# Patient Record
Sex: MALE | Race: BLACK OR AFRICAN AMERICAN | NOT HISPANIC OR LATINO | Employment: FULL TIME | URBAN - METROPOLITAN AREA
[De-identification: names, ages, dates, MRNs, and addresses within clinical notes are randomized per-mention and may not be internally consistent; named-entity substitution may affect disease eponyms.]

---

## 2017-03-20 ENCOUNTER — CLINICAL SUPPORT (OUTPATIENT)
Dept: INTERNAL MEDICINE | Facility: CLINIC | Age: 62
End: 2017-03-20

## 2017-03-20 DIAGNOSIS — Z02.83 ENCOUNTER FOR DRUG SCREENING: Primary | ICD-10-CM

## 2017-03-20 LAB — BREATH ALCOHOL: 0

## 2017-03-20 PROCEDURE — 82075 ASSAY OF BREATH ETHANOL: CPT | Mod: ,,, | Performed by: INTERNAL MEDICINE

## 2017-03-20 PROCEDURE — 99000 SPECIMEN HANDLING OFFICE-LAB: CPT | Mod: ,,, | Performed by: INTERNAL MEDICINE

## 2017-05-31 ENCOUNTER — DOCUMENTATION ONLY (OUTPATIENT)
Dept: FAMILY MEDICINE | Facility: CLINIC | Age: 62
End: 2017-05-31

## 2017-05-31 NOTE — PROGRESS NOTES
Pre-Visit Chart Review  For Appointment Scheduled on 05/31/2017    Health Maintenance Due   Topic Date Due    Hepatitis C Screening  1955    TETANUS VACCINE  05/03/1973    Colonoscopy  05/03/2005    Hemoglobin A1c  05/09/2012    Zoster Vaccine  05/03/2015    Lipid Panel  12/30/2016

## 2017-06-01 ENCOUNTER — OFFICE VISIT (OUTPATIENT)
Dept: FAMILY MEDICINE | Facility: CLINIC | Age: 62
End: 2017-06-01
Payer: COMMERCIAL

## 2017-06-01 ENCOUNTER — LAB VISIT (OUTPATIENT)
Dept: LAB | Facility: HOSPITAL | Age: 62
End: 2017-06-01
Attending: FAMILY MEDICINE
Payer: COMMERCIAL

## 2017-06-01 VITALS
BODY MASS INDEX: 33.01 KG/M2 | DIASTOLIC BLOOD PRESSURE: 72 MMHG | TEMPERATURE: 99 F | SYSTOLIC BLOOD PRESSURE: 113 MMHG | HEART RATE: 112 BPM | WEIGHT: 217.81 LBS | HEIGHT: 68 IN

## 2017-06-01 DIAGNOSIS — Z91.199 NON COMPLIANCE WITH MEDICAL TREATMENT: Chronic | ICD-10-CM

## 2017-06-01 DIAGNOSIS — Z12.11 COLON CANCER SCREENING: ICD-10-CM

## 2017-06-01 LAB
ALBUMIN SERPL BCP-MCNC: 3.9 G/DL
ALP SERPL-CCNC: 636 U/L
ALT SERPL W/O P-5'-P-CCNC: 40 U/L
ANION GAP SERPL CALC-SCNC: 9 MMOL/L
AST SERPL-CCNC: 20 U/L
BASOPHILS # BLD AUTO: 0.05 K/UL
BASOPHILS NFR BLD: 0.5 %
BILIRUB SERPL-MCNC: 0.5 MG/DL
BUN SERPL-MCNC: 21 MG/DL
CALCIUM SERPL-MCNC: 10.2 MG/DL
CHLORIDE SERPL-SCNC: 97 MMOL/L
CHOLEST/HDLC SERPL: 7.8 {RATIO}
CO2 SERPL-SCNC: 29 MMOL/L
CREAT SERPL-MCNC: 1.6 MG/DL
DIFFERENTIAL METHOD: ABNORMAL
EOSINOPHIL # BLD AUTO: 0 K/UL
EOSINOPHIL NFR BLD: 0.3 %
ERYTHROCYTE [DISTWIDTH] IN BLOOD BY AUTOMATED COUNT: 13.7 %
EST. GFR  (AFRICAN AMERICAN): 52.6 ML/MIN/1.73 M^2
EST. GFR  (NON AFRICAN AMERICAN): 45.5 ML/MIN/1.73 M^2
GLUCOSE SERPL-MCNC: 426 MG/DL
HCT VFR BLD AUTO: 46.1 %
HDL/CHOLESTEROL RATIO: 12.9 %
HDLC SERPL-MCNC: 303 MG/DL
HDLC SERPL-MCNC: 39 MG/DL
HGB BLD-MCNC: 16.9 G/DL
LDLC SERPL CALC-MCNC: 224.6 MG/DL
LYMPHOCYTES # BLD AUTO: 3.1 K/UL
LYMPHOCYTES NFR BLD: 29 %
MCH RBC QN AUTO: 27.7 PG
MCHC RBC AUTO-ENTMCNC: 36.7 %
MCV RBC AUTO: 76 FL
MONOCYTES # BLD AUTO: 0.8 K/UL
MONOCYTES NFR BLD: 7.3 %
NEUTROPHILS # BLD AUTO: 6.7 K/UL
NEUTROPHILS NFR BLD: 62.4 %
NONHDLC SERPL-MCNC: 264 MG/DL
PLATELET # BLD AUTO: 289 K/UL
PMV BLD AUTO: 11 FL
POTASSIUM SERPL-SCNC: 4.8 MMOL/L
PROT SERPL-MCNC: 7.8 G/DL
RBC # BLD AUTO: 6.1 M/UL
SODIUM SERPL-SCNC: 135 MMOL/L
T3FREE SERPL-MCNC: 3.2 PG/ML
TRIGL SERPL-MCNC: 197 MG/DL
TSH SERPL DL<=0.005 MIU/L-ACNC: 1.04 UIU/ML
WBC # BLD AUTO: 10.71 K/UL

## 2017-06-01 PROCEDURE — 84443 ASSAY THYROID STIM HORMONE: CPT

## 2017-06-01 PROCEDURE — 4010F ACE/ARB THERAPY RXD/TAKEN: CPT | Mod: S$GLB,,, | Performed by: FAMILY MEDICINE

## 2017-06-01 PROCEDURE — 90471 IMMUNIZATION ADMIN: CPT | Mod: S$GLB,,, | Performed by: FAMILY MEDICINE

## 2017-06-01 PROCEDURE — 90714 TD VACC NO PRESV 7 YRS+ IM: CPT | Mod: S$GLB,,, | Performed by: FAMILY MEDICINE

## 2017-06-01 PROCEDURE — 36415 COLL VENOUS BLD VENIPUNCTURE: CPT | Mod: PO

## 2017-06-01 PROCEDURE — 99999 PR PBB SHADOW E&M-EST. PATIENT-LVL IV: CPT | Mod: PBBFAC,,, | Performed by: FAMILY MEDICINE

## 2017-06-01 PROCEDURE — 99203 OFFICE O/P NEW LOW 30 MIN: CPT | Mod: 25,S$GLB,, | Performed by: FAMILY MEDICINE

## 2017-06-01 PROCEDURE — 85025 COMPLETE CBC W/AUTO DIFF WBC: CPT

## 2017-06-01 PROCEDURE — 90472 IMMUNIZATION ADMIN EACH ADD: CPT | Mod: S$GLB,,, | Performed by: FAMILY MEDICINE

## 2017-06-01 PROCEDURE — 83036 HEMOGLOBIN GLYCOSYLATED A1C: CPT

## 2017-06-01 PROCEDURE — 80053 COMPREHEN METABOLIC PANEL: CPT

## 2017-06-01 PROCEDURE — 84481 FREE ASSAY (FT-3): CPT

## 2017-06-01 PROCEDURE — 90732 PPSV23 VACC 2 YRS+ SUBQ/IM: CPT | Mod: S$GLB,,, | Performed by: FAMILY MEDICINE

## 2017-06-01 PROCEDURE — 80061 LIPID PANEL: CPT

## 2017-06-01 RX ORDER — PRAVASTATIN SODIUM 40 MG/1
40 TABLET ORAL DAILY
Qty: 90 TABLET | Refills: 3 | Status: SHIPPED | OUTPATIENT
Start: 2017-06-01 | End: 2021-02-10

## 2017-06-01 RX ORDER — METFORMIN HYDROCHLORIDE 500 MG/1
500 TABLET ORAL 2 TIMES DAILY WITH MEALS
COMMUNITY
End: 2017-06-01 | Stop reason: SDUPTHER

## 2017-06-01 RX ORDER — NAPROXEN SODIUM 220 MG/1
81 TABLET, FILM COATED ORAL DAILY
Refills: 0 | COMMUNITY
Start: 2017-06-01 | End: 2023-02-02

## 2017-06-01 RX ORDER — PEN NEEDLE, DIABETIC 30 GX3/16"
1 NEEDLE, DISPOSABLE MISCELLANEOUS DAILY
Qty: 300 EACH | Refills: 11 | Status: SHIPPED | OUTPATIENT
Start: 2017-06-01 | End: 2021-02-08 | Stop reason: SDUPTHER

## 2017-06-01 RX ORDER — LISINOPRIL 2.5 MG/1
2.5 TABLET ORAL DAILY
Qty: 90 TABLET | Refills: 3 | Status: SHIPPED | OUTPATIENT
Start: 2017-06-01 | End: 2021-02-10

## 2017-06-01 RX ORDER — ERGOCALCIFEROL 1.25 MG/1
50000 CAPSULE ORAL
Qty: 90 CAPSULE | Refills: 1 | Status: SHIPPED | OUTPATIENT
Start: 2017-06-01 | End: 2017-06-26 | Stop reason: DRUGHIGH

## 2017-06-01 RX ORDER — METFORMIN HYDROCHLORIDE 500 MG/1
1000 TABLET ORAL 2 TIMES DAILY WITH MEALS
Qty: 120 TABLET | Refills: 4 | Status: SHIPPED | OUTPATIENT
Start: 2017-06-01 | End: 2017-06-22 | Stop reason: ALTCHOICE

## 2017-06-01 RX ORDER — INSULIN GLARGINE 300 [IU]/ML
25 INJECTION, SOLUTION SUBCUTANEOUS NIGHTLY
Qty: 3 SYRINGE | Refills: 11 | Status: SHIPPED | OUTPATIENT
Start: 2017-06-01 | End: 2017-06-22 | Stop reason: SDUPTHER

## 2017-06-01 NOTE — PATIENT INSTRUCTIONS
Preventing Cancer  Many cancer cases are linked to lifestyle. Healthy lifestyle choices can help lower your risk for cancer and many other diseases. They can also improve your overall health.    Stop smoking  · Talk with your healthcare provider about aids for quitting, such as nicotine patches and some prescription medicines.  · Get help from ex-smokers.  · Create a plan for quitting.  · Pick a quit date and stick to it.  Stay at a healthy weight  · Get to and stay at a healthy weight all your life.  · If you're overweight, losing even a little weight is good for you.  Keep active  · Get regular physical activity.  · Take walks, garden, or do other activities you enjoy each day.  · Do errands on foot or bike, not by car.  · Join a walking or biking club.  · Limit the time you spend sitting to do things like watch TV, play video games, or use a computer.  Eat a healthy diet  · Eat fewer red meats and processed meats.  · Eat at least 2.5 cups of fruits and vegetables daily, especially leafy greens.  · Eat more whole grains instead of refined grain products.  · Limit alcohol to 2 drinks a day for men and 1 drink a day for women.  · Limit high-calorie foods and drinks.  · Read food labels to be more aware of calories and portion sizes.  Protect yourself from hazards  · When outside during the day, use sunscreen that is broad-spectrum and SPF 30 or greater.  · When out in sunlight, wear a hat and sunglasses.  · Seek shade in the middle of the day when the sun is hottest.  · Be aware of all hazardous products at work or in your home.  · When working with hazardous products, wear protective clothing  Talk with your provider about cancer screenings  Regular screening can help prevent some types of cancer, such as cervical and colorectal cancer. Regular screening for these cancers can find and remove abnormal areas before they become cancer. For some other types of cancer, screening may help find cancer early, when it's  small. This is when treatment is most likely to be effective. Here are some ways you can screen for certain cancers:  · Breast cancer. Breast self-awareness and mammogram.  · Skin cancer. Self-exam, professional exam, biopsy of any changes that might be cancer.  · Cervical cancer. Pap test, HPV test.  · Colorectal cancer. Screening for blood or DNA in stool, colonoscopy or other tests to look inside the colon.  · Prostate cancer. PSA blood test with or without a digital rectal exam.  · Testicular cancer. Self-exam, professional exam.  Talk with your healthcare provider about your family history and your cancer risk. Together you can decide on the cancer screening plan that's best for you.  Date Last Reviewed: 11/18/2015 © 2000-2016 [employer]. [address] [place] [address], [place] [address]. All rights reserved. This information is not intended as a substitute for professional medical care. Always follow your healthcare professional's instructions.        Using a Blood Sugar Log    You have diabetes. This means your body has trouble regulating a sugar called glucose. To help manage your diabetes, youll need to check your blood sugar level as directed by your healthcare provider. Keeping a log of your blood sugar levels will help you track your blood sugar readings. Its a simple and easy way to see how well you are controlling your diabetes.  Checking your blood sugar level  You can check your blood sugar level with a blood glucose meter. Youll first prick the side of your finger with a tiny lancet to draw a tiny drop of blood onto the test strip. Some glucose meters let you use another place on your body to test. But these other places should not be used in some cases as they may be inaccurate. Follow the instructions for your glucose meter. And talk with your healthcare provider before doing the test on other places.  The strip goes into the meter first, then a drop of blood is placed on the tip of the  strip. The meter then shows a reading that tells you the level of your blood sugar. Your readings should be in your target range as often as possible. This means not too high or too low. Staying in this range helps lower your risk for complications. Your healthcare provider will help you figure out the target range that is best for you.  Tracking your readings  Every time you check your blood sugar, use your log to keep track of your readings. Your meter will also probably have a memory feature that your healthcare provider can check at your next visit. You may be advised by your healthcare provider to check your blood sugar in the morning, at bedtime, and before and after meals. Be sure to write down all of your numbers. Also use your log to record things that might have affected your blood sugar. Some examples include being sick, certain medicines, being physically active, feeling stressed, or skipping meals.   Lessons learned from your readings  Tracking your blood sugar readings helps you see patterns. These patterns tell you how your actions affect your blood sugar. For instance, you may have higher numbers after eating certain foods or lower numbers after exercise. They just help you understand how to stay in your target range more often, so that your diabetes remains in good control.  Sharing your log with your healthcare team  Bring your blood sugar log and glucose meter with you to all of your healthcare appointments. This can help your healthcare team make changes to your treatment plan, if needed. This may involve making changes in what you eat, what medicines you take, or how much you exercise.  To learn more  The resources below can help you learn more:  · American Diabetes Association 970-943-6511 www.diabetes.org  · [employer] 444-625-6988 www.lighthouse.org  · [employer] 186-715-6577 www.nei.nih.gov  · Hormone Health Network 510-561-8436 www.hormone.org  Date Last Reviewed:  "5/1/2016  © 3454-9095 [employer]. [address] [place] [address], [place] [address]. All rights reserved. This information is not intended as a substitute for professional medical care. Always follow your healthcare professional's instructions.        Exercise to Manage Your Blood Sugar    Being physically active every day can help you manage your blood sugar. Thats because an active lifestyle can improve your bodys ability to use insulin. Daily activity can also help delay or prevent complications of diabetes. And its a great way to relieve stress. If you arent normally active, be sure to consult your healthcare provider before getting started.  How much activity do you need?  If daily activity is new to you, start slow and steady. Begin with 10 minutes of activity each day. Then work up to at least 40 minutes of moderate to high intensity physical activity on at least 3 to 4 days each week. Do this by adding a few minutes each week. It doesnt have to be done all at once. Each active period throughout the day adds up.  Just move!  You dont have to join a gym or own pricey sports equipment. Just get out and walk. Walking is an aerobic exercise that makes your heart and lungs work hard. It helps your heart and blood vessels. Walking needs only a sturdy pair of sneakers and your own two feet. The more you walk, the easier it gets:  · Schedule time every day to move your feet.  · Make it part of your daily routine.  · Walk with a friend or a group to keep it interesting and fun.  · Try taking several short walks during the day to meet your daily activity goal.  A pedometer makes every step count  A pedometer is a small device that keeps track of how many steps you take. You can clip it to your belt (or a strap on your arm or leg) and go about your daily routine. "Smartphones" now also have apps to record your walking. At the end of the day, the pedometer shows the total number of steps you took. Use a " pedometer to set daily goals for yourself. For instance, if you walk 4,000 steps a day, try adding 200 more steps each day. Aim for a goal of 7,500. With every step, youre doing a little more to help your body use insulin.   Adding resistance exercise  Resistance exercise (also called strength training), makes muscles stronger. It also helps muscles use insulin better. Ask your healthcare provider whether this type of exercise is right for you. If it is, your healthcare provider can help you work it in to your activity plan.  Staying safe  Being active may cause blood sugar to drop faster than usual. This is especially true if you take medicine to manage your blood sugar. But there are things you can do to help reduce the risk of accidental lows. Keep these tips in mind:  · Always carry identification when you exercise outside your home. Carry a cell phone to use in case of emergency.  · If you can, include friends and family in your activities.  · Wear a medical ID bracelet that says you have diabetes.  · Use the right safety equipment for the activity you do (such as a bicycle helmet when you ride a bicycle outdoors). Wear closed-toed shoes that fit your feet well.  · Drink plenty of water before and during activity.  · Keep a fast-acting sugar (such as glucose tablets) on hand in case of low blood sugar.  · Dress properly for the weather. Wear a hat if its dillon, or wait until evening if its too hot.  · Avoid being active for long periods in very hot or very cold weather.  · Skip activity if youre sick.     Notice how activity affects blood sugar  Physical activity is important when you have diabetes. But you need to keep an eye on your blood sugar level. Check often if you have been active for longer than usual, or if the activity was unplanned. Make it a habit to check your blood sugar before being active. And check again a few hours later. Use your log book to write down how activity affects your numbers.  If you take insulin, you may be able to adjust your dose before a planned activity. This can help prevent lows. You may also need to take a small carbohydrate snack before the exercise. Talk to your healthcare provider to learn more.    Date Last Reviewed: 6/1/2016 © 2000-2016 [employer]. [address] [place] [address], [place] [address]. All rights reserved. This information is not intended as a substitute for professional medical care. Always follow your healthcare professional's instructions.        Eating Out When You Have Diabetes  Eating right is an important part of keeping your blood sugar in your target range. You just need to make healthy choices.    Tips for restaurant meals  When you eat away from home try these tips:  · Try to schedule your dining-out meal at your normal meal time. Make a reservation if possible, so you don't have to wait to eat. If you can't make a reservation, try to arrive at the restaurant at a less-busy time to cut down your wait time. Eat a small fruit or starch snack at your regular mealtime if your restaurant meal is going to be later than usual.   · Call ahead to see if the restaurant can meet your dietary needs if you've never been there before. Or you can go online to see the menu ahead of time.  · Carry some crackers with you in case the restaurant needs you to wait until you can be served.  · Ask how foods are prepared before you order.  · Instead of fried, sautéed, or breaded foods, choose ones that are broiled, steamed, grilled, or baked.  · Ask for sauces, gravies, and dressings on the side.  · Only eat an amount that fits your meal plan. Remember: You can take home the leftovers.  · Save dessert for special occasions. Then choose a small dessert or share one with a friend or family member.  Make healthy choices  Fast food  · Garden salad with light dressing on the side  · Baked potato with vegetables or herbs  · Broiled, roasted, or grilled chicken  sandwich  · Sliced turkey or lean roast beef sandwich  Mexican  · Chicken enchilada, without cheese or sour cream   · Small burrito with whole beans and chicken  · Whole beans (not refried) and rice  · Chicken or fish fajitas  Steakhouse  · Grilled or broiled lean cuts of beef  · Baked potato with vegetables or herbs  · Broiled or baked chicken. Dont eat the skin.  · Steamed vegetables  Asian  · Steamed dumplings or potstickers  · Broiled, boiled, or steamed meats or fish  · Sushi or sashimi  · Steamed rice or boiled noodles. One serving is equal to 1/3 cup.  Date Last Reviewed: 6/1/2016 © 2000-2016 [employer]. [address] [place] [address], [place] [address]. All rights reserved. This information is not intended as a substitute for professional medical care. Always follow your healthcare professional's instructions.        Diabetes: Tracking Your Fitness Progress    Tracking your fitness progress can help you improve your long-term health. Seeing how far youve come may motivate you to achieve more. Your doctor can also use a record of any progress to help plan your treatment.  Recording blood sugar levels  Your healthcare provider may have shown you how to check your blood sugar. Now that youre more active, you may need to check it more often. Keep a blood sugar log. That way, you can see how your efforts are paying off. You may also include a column for blood sugar readings in a fitness log (see Keeping a Fitness Log below). Bring any log books with you on healthcare provider visits. Your healthcare provider can use these records to help decide whether to adjust your medications.  Setting a fitness goal  A fitness goal gives you something to reach for. Set a goal you can achieve. It does no good if your goal is beyond your ability. And choose a goal that focuses on action. For instance, your first goal may be to take two 10-minute walks a day for one week. After you reach your first goal, try making  the next one more challenging. Invite a friend to exercise with you so you can encourage each other to strive toward your goals.   Keeping a fitness log  Include the information that matters most to you in your fitness log. This may be how you felt before, during, or after exercising. And dont forget to record your blood sugar reading. As time goes on, compare your first entry with more recent entries. You may see a rise in your fitness level and a drop in your blood sugar.  Your fitness reward  Your chances of reaching a goal increase if you plan a reward. Write down a nonfood reward that matters to you. For instance, you might reward yourself with a night at the movies, a new warm-up suit, or some relaxing music.  Date Last Reviewed: 7/1/2016 © 2000-2016 [employer]. [address] [place] [address]. All rights reserved. This information is not intended as a substitute for professional medical care. Always follow your healthcare professional's instructions.        Resources for People with Diabetes  Living with diabetes means making many changes in your life, and these changes may seem overwhelming. Thats a normal reaction. When you feel down, reach out to your family and friends. Your healthcare team is also there when you have questions or need advice.  How to help yourself  Tips for taking care of yourself include the following:   · Do things that you enjoy, like seeing a favorite movie, reading a good book, or listening to music.  · Call a good friend just to chat.  · Take a walk or do some gardening. Physical activity can relieve stress and lift your mood.  · Stick to your treatment program. Keeping your blood glucose in your target range will help you feel better.  · If you feel your plan isn't working for you, is too cumbersome, or is too expensive, discuss this with your healthcare providers.   How to get help from others  Tips for getting help include the following:   · Talk to  your friends and family about how youre feeling. Give them information, like this health sheet, to help them understand more about diabetes.  · Invite a family member to join you at your next appointment with your diabetes support team so they can learn ways to help support you.   · Join a diabetes support group. Support groups let you talk to other people with diabetes and share concerns, experiences, and tips for solving problems.  · Your local library, community center, [employer] group, senior center, or hospital may have information about support groups in your area.  · Some healthcare organizations support Internet-based chat groups.  Resources  These organizations provide information, educational programs, and other services. They are there to help you.  · American Diabetes Tndrfcvppyi573-838-1307aze.diabetes.org  · National Diabetes Information Dxmvjyynlnuyq540-525-9861cgy.diabetes.niddk.nih.gov · American Heart Blbbcpdvwto455-029-9630vwz.heart.org  · Academy of Nutrition and Dietetics  www.eatright.org  · Juvenile Diabetes Research Foundation  www.jdrf.org   Talk with your healthcare provider if youre feeling helpless or hopeless or are having trouble sleeping or eating. These may be symptoms of depression, a serious but treatable problem.   Date Last Reviewed: 5/1/2016  © 3001-4960 [employer]. [address] [place] [address], [place] [address]. All rights reserved. This information is not intended as a substitute for professional medical care. Always follow your healthcare professional's instructions.        Diabetes: Learning About Serving and Portion Sizes     A good rule of thumb: Devote half your plate to vegetables and green salad. Split the other half between protein and starchy carbohydrates. Fruit makes a good dessert.     Servings and portions. Whats the difference? These terms can be very confusing. But learning to measure serving sizes can help you figure out how many carbohydrates  (carbs) and other foods you eat each day. They are also powerful tools for managing your weight.  Servings and portions  Many different words are used to describe amounts of food. If your health care provider uses a term youre not sure of, dont be afraid to ask. It helps to know the difference between servings and portions:  · A serving size is a fixed size. Food producers use this term to describe their products. For example, the label on a cereal box could say that 1 cup of dry cereal = 1 serving.  · A portion (also called a helping) is how much you eat or how much you put on your plate at a meal. For example, you might eat 2 cups of cereal at breakfast.  Using serving information  The portion you choose to eat (such as 2 cups of cereal) may be more than one serving as listed on the food label (such as 1 cup of cereal). Thats why it helps to measure or weigh the food you eat. Because the food label values are based on servings, youll need to know how many servings you eat at one sitting.     Ounces: 2 to 3 ounces is about the size of your palm.       1 Cup: 1 cup (or a medium-sized piece) is about the size of your fist.       1/2 Cup: 1/2 cup is about the size of your cupped hand.      One tablespoon is about the size of your thumb.  One teaspoon is about the size of the tip of your thumb.  Keeping track of serving sizes  When youre planning for a snack or a meal, keep servings in mind. If you dont have measuring cups or a scale handy, there are ways to eyeball serving sizes, such as comparing your food to the size of your hand (see pictures above).  Managing portion sizes  If your weight is a concern, reducing your portions can help. You can eat more than one serving of a food at once. But to keep from eating too much at one meal, learn how to manage your portions. A portion is the amount of each type of food on your plate. See the plate diagram for an example of balanced portions.  Date Last  Reviewed: 3/1/2016  © 1640-3970 [employer]. [address] [place] [address], [place] [address]. All rights reserved. This information is not intended as a substitute for professional medical care. Always follow your healthcare professional's instructions.        MyPlate Worksheet: 1,200 Calories  Your calorie needs are about 1,200 calories a day. Below are the U.S. Department of Agriculture (USDA) guidelines for your daily recommended amount of each food group.  Vegetables  1½ cups Fruits  1 cup Grains  4 ounces Dairy  2½ cups Protein  3 ounces   Eat a variety of vegetables each day.  Aim for these amounts each week:  · 1 cup dark green vegetables  · 3 cups red or orange-colored vegetables  · ½ cup dry beans and peas  · 3½ cups starchy vegetables  · 2½ cups other vegetables Eat a variety of fruits each day.  Go easy on fruit juices.  Good choices of fruits include:  · Berries  · Bananas  · Apples  · Melon  · Dried fruit  · Frozen fruit  · Canned fruit Choose whole grains whenever you can.  Aim to eat at least 2 ounces of whole grains each day:  · Bread  · Cereal  · Rice  · Pasta  · Potatoes  · Tortillas Choose low-fat or fat-free milk, yogurt, or cheese each day.  Good choices include:  · Low-fat or fat-free milk or chocolate milk  · Low-fat or fat-free yogurt  · Low-fat or fat-free cottage cheese or other reduced-fat cheeses  · Calcium-fortified milk alternatives Choose low-fat or lean meats, poultry, fish and seafood each day.  Vary your protein. Choose more:  · Fish and other seafood  · Lean low-fat meat and poultry  · Eggs  · Beans, peas  · Tofu  · Unsalted nuts and seeds  Choose less high-fat and red meat.   Source: USDA MyPlate, www.choosemyplate.gov  Know your limits on oils (fats) and sugars:  · Your allowance for oils is 17 grams or about 4 teaspoons a day (oil includes vegetable oil, mayonnaise, soft margarine, salad dressing, nuts, olives, avocados, and some fish).  · Limit the extras (solid fats  "and sugars, also called "empty calories") to 100 calories a day.  · Cut back on salt (sodium). Stay under 2,300 mg sodium a day. If you have a health condition such as heart disease or high blood pressure, your doctor will likely tell you to limit sodium to no more than 1,500 mg a day.  Get moving and be active!  Aim for at least 30 minutes of physical activity most days of the week or 150 minutes of exercise a week.  MyPlate Servings Worksheet: 1,200 calories  This worksheet tells you how many servings you should get each day from each food group, and tells you how much food makes a serving. Use this as a guide as you plan your meals throughout the day. Track your progress daily by writing in what you actually ate.  Food Group  Daily MyPlate Goal  What You Ate Today    Vegetables 3 Half-cups or 3 Servings  One serving is:  ½ cup cut-up raw or cooked vegetables  1 cup raw, leafy vegetables  ½ baked sweet potato  ½ cup vegetable juice  Note: At meals, fill half your plate with vegetables and fruit.     Fruits 2 Half-cups or 2 Servings  One serving is:  ½ cup fresh, frozen, or canned fruit  1 medium piece of fruit  1 cup of berries or melon  ½ cup dried fruit  ½ cup 100% fruit juice  Note: Make most choices fruit instead of juice.     Grains 4 Servings or 4 Ounces  One serving is:  1 slice bread  1 cup dry cereal  ½ cup cooked rice, pasta, or cereal  1 5-inch tortilla  Note: Choose whole grains for at least half of your servings each day.     Dairy 2 Servings or 2 Cups  One serving is:  1 cup milk  1½ ounces reduced-fat hard cheese  2 ounces processed cheese  1 cup low-fat yogurt  1/3 cup shredded cheese  Note: Choose low-fat or fat-free most often.     Protein 3 Servings or 3 Ounces  One serving is:  1 ounce cooked lean beef, pork, lamb, or ham  1 ounce cooked chicken or turkey (no skin)  1 ounce cooked fish or shellfish (not fried)  1 egg  ¼ cup egg substitute  ½ ounce nuts or seeds  1 tablespoon peanut or almond " butter  ¼ cup cooked dry beans or peas  ½ cup tofu  2 tablespoons hummus     Date Last Reviewed: 6/1/2015  © 8479-3165 [employer]. [address] [place] [address], [place] [address]. All rights reserved. This information is not intended as a substitute for professional medical care. Always follow your healthcare professional's instructions.

## 2017-06-01 NOTE — PROGRESS NOTES
Subjective:       Patient ID: Mark Patino is a 62 y.o. male.    Chief Complaint: Diabetes and Hypertension    Diabetes   He presents for his follow-up diabetic visit. He has type 2 diabetes mellitus. His disease course has been fluctuating. Hypoglycemia symptoms include nervousness/anxiousness. Pertinent negatives for hypoglycemia include no confusion, dizziness, headaches, pallor, speech difficulty or tremors. Associated symptoms include visual change. Pertinent negatives for diabetes include no chest pain, no fatigue, no foot paresthesias, no polydipsia, no polyphagia and no polyuria. There are no hypoglycemic complications. Symptoms are stable. There are no diabetic complications. Risk factors for coronary artery disease include diabetes mellitus, male sex, obesity, dyslipidemia, family history and sedentary lifestyle. Current diabetic treatment includes oral agent (monotherapy) and insulin injections (poor compliance with care sionce lack of insurance and poor adherence ). He is compliant with treatment some of the time. He is following a high fat/cholesterol diet. He has not had a previous visit with a dietitian. He participates in exercise intermittently. His home blood glucose trend is fluctuating dramatically. His breakfast blood glucose is taken between 9-10 am. His breakfast blood glucose range is generally >200 mg/dl. An ACE inhibitor/angiotensin II receptor blocker is not being taken. He does not see a podiatrist.Eye exam is not current.     Review of Systems   Constitutional: Negative.  Negative for activity change, appetite change, chills, diaphoresis, fatigue, fever and unexpected weight change.   HENT: Negative.  Negative for congestion, drooling, ear discharge, ear pain, hearing loss, mouth sores, nosebleeds, postnasal drip, rhinorrhea, sinus pressure, sore throat, tinnitus, trouble swallowing and voice change.    Eyes: Negative.  Negative for pain, discharge, redness, itching and visual  disturbance.   Respiratory: Negative.  Negative for apnea, cough, choking, chest tightness and shortness of breath.    Cardiovascular: Negative.  Negative for chest pain, palpitations and leg swelling.   Gastrointestinal: Negative.  Negative for abdominal distention, abdominal pain and anal bleeding.   Endocrine: Negative.  Negative for cold intolerance, heat intolerance, polydipsia, polyphagia and polyuria.   Genitourinary: Negative.  Negative for difficulty urinating, dysuria, enuresis, flank pain, frequency, hematuria, scrotal swelling, testicular pain and urgency.   Musculoskeletal: Negative.  Negative for arthralgias, back pain, gait problem, neck pain and neck stiffness.   Skin: Negative.  Negative for color change, pallor and rash.   Allergic/Immunologic: Negative.  Negative for environmental allergies and food allergies.   Neurological: Negative.  Negative for dizziness, tremors, syncope, facial asymmetry, speech difficulty, light-headedness, numbness and headaches.   Hematological: Negative for adenopathy. Does not bruise/bleed easily.   Psychiatric/Behavioral: Negative for agitation, behavioral problems, confusion, decreased concentration, dysphoric mood and hallucinations. The patient is nervous/anxious. The patient is not hyperactive.        Objective:      Physical Exam   Constitutional: He is oriented to person, place, and time. He appears well-developed and well-nourished. No distress.   HENT:   Head: Normocephalic and atraumatic.   Right Ear: External ear normal.   Left Ear: External ear normal.   Nose: Nose normal.   Mouth/Throat: Oropharynx is clear and moist. No oropharyngeal exudate.   Eyes: Conjunctivae and EOM are normal. Pupils are equal, round, and reactive to light. Right eye exhibits no discharge. Left eye exhibits no discharge. No scleral icterus.   Neck: Normal range of motion. Neck supple. No JVD present. No tracheal deviation present. No thyromegaly present.   Cardiovascular: Normal  rate, normal heart sounds and intact distal pulses.    No murmur heard.  Pulses:       Dorsalis pedis pulses are 3+ on the right side, and 3+ on the left side.        Posterior tibial pulses are 3+ on the right side, and 3+ on the left side.   Pulmonary/Chest: Effort normal and breath sounds normal. No respiratory distress. He has no wheezes. He has no rales.   Abdominal: Soft. Bowel sounds are normal. He exhibits no distension and no mass. There is no tenderness. There is no rebound and no guarding.   Musculoskeletal: He exhibits no edema or tenderness.        Right foot: There is normal range of motion and no deformity.          Feet:   Right Foot:   Protective Sensation: 6 sites tested. 6 sites sensed.   Skin Integrity: Negative for ulcer.   Left Foot:   Protective Sensation: 6 sites tested. 6 sites sensed.   Skin Integrity: Negative for ulcer or blister.   Lymphadenopathy:     He has no cervical adenopathy.   Neurological: He is alert and oriented to person, place, and time. No cranial nerve deficit. Coordination normal.   Skin: Skin is warm and dry. No rash noted. He is not diaphoretic. No erythema. No pallor.   Psychiatric: He has a normal mood and affect. His behavior is normal. Judgment and thought content normal.   Vitals reviewed.      Assessment:       1. Uncontrolled type 2 diabetes mellitus with diabetic neuropathy, with long-term current use of insulin    2. Colon cancer screening    3. Adult BMI 35.0-35.9 kg/sq m    4. Obesity, Class II, BMI 35.0-39.9, with comorbidity (see actual BMI)    5. Non compliance with medical treatment        Plan:       Uncontrolled type 2 diabetes mellitus with diabetic neuropathy, with long-term current use of insulin  -     POCT Glucose  -     Comprehensive metabolic panel; Future; Expected date: 06/01/2017  -     Lipid panel; Future; Expected date: 06/01/2017  -     CBC auto differential; Future; Expected date: 06/01/2017  -     Hemoglobin A1c; Future; Expected date:  "06/01/2017  -     TSH; Future; Expected date: 06/01/2017  -     T3, free; Future; Expected date: 06/01/2017  -     MICROALBUMIN / CREATININE RATIO URINE  -     Ambulatory consult to Diabetic Education  -     Ambulatory referral to Optometry    Colon cancer screening  -     Occult Blood Stool, CA Screen; Future; Expected date: 06/01/2017  -     Occult Blood Stool, CA Screen; Future; Expected date: 06/01/2017  -     Occult Blood Stool, CA Screen; Future; Expected date: 06/01/2017  -     Ambulatory referral to Gastroenterology    Other orders  -     metformin (GLUCOPHAGE) 500 MG tablet; Take 2 tablets (1,000 mg total) by mouth 2 (two) times daily with meals.  Dispense: 120 tablet; Refill: 4  -     insulin glargine, TOUJEO, (TOUJEO SOLOSTAR) 300 unit/mL (1.5 mL) InPn pen; Inject 25 Units into the skin every evening.  Dispense: 3 Syringe; Refill: 11  -     pen needle, diabetic (BD ULTRA-FINE MACKENZIE PEN NEEDLES) 32 gauge x 5/32" Ndle; 1 each by Misc.(Non-Drug; Combo Route) route once daily at 6am.  Dispense: 300 each; Refill: 11  -     ergocalciferol (ERGOCALCIFEROL) 50,000 unit Cap; Take 1 capsule (50,000 Units total) by mouth every 7 days.  Dispense: 90 capsule; Refill: 1  -     lisinopril (PRINIVIL,ZESTRIL) 2.5 MG tablet; Take 1 tablet (2.5 mg total) by mouth once daily.  Dispense: 90 tablet; Refill: 3  -     pravastatin (PRAVACHOL) 40 MG tablet; Take 1 tablet (40 mg total) by mouth once daily.  Dispense: 90 tablet; Refill: 3  -     aspirin 81 MG Chew; Take 1 tablet (81 mg total) by mouth once daily.; Refill: 0  -     Pneumococcal Polysaccharide Vaccine (23 Valent) (SQ/IM)  -     Td Vaccine- Preservative Free      Patient readiness: acceptance and barriers:readiness, social stressors, environmental, economic and occupational issues    During the course of the visit the patient was educated and counseled about the following:     Diabetes:  Discussed general issues about diabetes pathophysiology and " management.  Educational material distributed.  Addressed ADA diet.  Suggested low cholesterol diet.  Encouraged aerobic exercise.  Hypertension:   Dietary sodium restriction.  Regular aerobic exercise.  Check blood pressures daily and record.  Obesity:   General weight loss/lifestyle modification strategies discussed (elicit support from others; identify saboteurs; non-food rewards, etc).  Regular aerobic exercise program discussed.  Medication: bulk-forming agents.    Goals: Diabetes: Maintain Hemoglobin A1C below 7, Hypertension: Reduce Blood Pressure and Obesity: Reduce calorie intake and BMI    Did patient meet goals/outcomes: Yes    The following self management tools provided: blood glucose log  excercise log    Patient Instructions (the written plan) was given to the patient/family.     Time spent with patient: 45 minutes

## 2017-06-01 NOTE — PROGRESS NOTES
2 patient identifiers used (name and ). Administered Pneumovax vaccine IM. Patient tolerated well, no bleeding at insertion site noted. Pain scale 1/10. Aseptic technique maintained. Immunization information given to patient.2 patient identifiers used (name and ). Administered Td vaccine IM. Patient tolerated well, no bleeding at insertion site noted. Pain scale 1/10. Aseptic technique maintained. Immunization information given to patient.

## 2017-06-02 LAB
ESTIMATED AVG GLUCOSE: 269 MG/DL
HBA1C MFR BLD HPLC: 11 %

## 2017-06-13 ENCOUNTER — OFFICE VISIT (OUTPATIENT)
Dept: OPTOMETRY | Facility: CLINIC | Age: 62
End: 2017-06-13
Payer: COMMERCIAL

## 2017-06-13 DIAGNOSIS — H25.13 NUCLEAR SCLEROSIS, BILATERAL: ICD-10-CM

## 2017-06-13 DIAGNOSIS — E11.9 DIABETES MELLITUS WITHOUT OPHTHALMIC MANIFESTATIONS: Primary | ICD-10-CM

## 2017-06-13 DIAGNOSIS — H52.7 REFRACTIVE ERROR: ICD-10-CM

## 2017-06-13 DIAGNOSIS — H26.9 CORTICAL CATARACT: ICD-10-CM

## 2017-06-13 PROCEDURE — 99999 PR PBB SHADOW E&M-EST. PATIENT-LVL I: CPT | Mod: PBBFAC,,, | Performed by: OPTOMETRIST

## 2017-06-13 PROCEDURE — 92004 COMPRE OPH EXAM NEW PT 1/>: CPT | Mod: S$GLB,,, | Performed by: OPTOMETRIST

## 2017-06-13 NOTE — LETTER
June 13, 2017      Oskar Mcfadden MD  [address]  [place] [address]           [facility]  [address] [place] [address] [address]  [place] [address]  Phone: [phone]          Patient: Mark Patino   MR Number: [id number]   YOB: 1955   Date of Visit: 6/13/2017       Dear Dr. Oskar Mcfadden:    Thank you for referring Mark Patino to me for evaluation. Attached you will find relevant portions of my assessment and plan of care.    If you have questions, please do not hesitate to call me. I look forward to following Mark Patino along with you.    Sincerely,    Declan Purdy, OD    Enclosure  CC:  No Recipients    If you would like to receive this communication electronically, please contact [email or web address] or [phone] to request more information on [employer] access.    For providers and/or their staff who would like to refer a patient to [facility], please contact us through our one-stop-shop provider referral line, Daniel Valenzuela, at [phone].    If you feel you have received this communication in error or would no longer like to receive these types of communications, please e-mail [email or web address]

## 2017-06-16 ENCOUNTER — CLINICAL SUPPORT (OUTPATIENT)
Dept: DIABETES | Facility: CLINIC | Age: 62
End: 2017-06-16
Payer: COMMERCIAL

## 2017-06-16 PROCEDURE — 99999 PR PBB SHADOW E&M-EST. PATIENT-LVL II: CPT | Mod: PBBFAC,,,

## 2017-06-16 PROCEDURE — G0108 DIAB MANAGE TRN  PER INDIV: HCPCS | Mod: S$GLB,,, | Performed by: DIETITIAN, REGISTERED

## 2017-06-19 VITALS — BODY MASS INDEX: 32.89 KG/M2 | HEIGHT: 68 IN | WEIGHT: 217 LBS

## 2017-06-19 NOTE — PROGRESS NOTES
Patient arrive late to appointment and once he checked in, he was not in waiting room when visit should start.  Briefly reviewed intake.  Reports for breakfast this am he had almonds and boiled eggs with juice to drink.  For lunch yesterday he had [employer] salad with fruit punch.  Dinner last night was ramen noodles with powerade.  Before bed last night his wife provided a roast beef sandwich with french fries and juice to drink.    Patient last seen 4/2012 and he was educated then about eliminating regular soft drinks and beverages with sugar from his diet.  Again instructed patient to stop drinking beverages with sugar.  List provided of allowed beverages.    Patient reports fasting this am 260  At time of visit glucose 270.  He reports this is an improvement since he started insulin.    Encouraged patient to continue with diet and provided contact number for future questions and or concerns.

## 2017-06-21 ENCOUNTER — DOCUMENTATION ONLY (OUTPATIENT)
Dept: FAMILY MEDICINE | Facility: CLINIC | Age: 62
End: 2017-06-21

## 2017-06-21 NOTE — PROGRESS NOTES
Pre-Visit Chart Review  For Appointment Scheduled on 6-    Health Maintenance Due   Topic Date Due    Hepatitis C Screening  1955    Colonoscopy  05/03/2005    Zoster Vaccine  05/03/2015

## 2017-06-22 ENCOUNTER — NURSE TRIAGE (OUTPATIENT)
Dept: ADMINISTRATIVE | Facility: CLINIC | Age: 62
End: 2017-06-22

## 2017-06-22 ENCOUNTER — OFFICE VISIT (OUTPATIENT)
Dept: FAMILY MEDICINE | Facility: CLINIC | Age: 62
End: 2017-06-22
Payer: COMMERCIAL

## 2017-06-22 VITALS
SYSTOLIC BLOOD PRESSURE: 102 MMHG | TEMPERATURE: 99 F | WEIGHT: 222 LBS | DIASTOLIC BLOOD PRESSURE: 69 MMHG | HEIGHT: 68 IN | HEART RATE: 79 BPM | BODY MASS INDEX: 33.65 KG/M2

## 2017-06-22 DIAGNOSIS — Z91.199 NON COMPLIANCE WITH MEDICAL TREATMENT: Chronic | ICD-10-CM

## 2017-06-22 DIAGNOSIS — E55.9 HYPOVITAMINOSIS D: ICD-10-CM

## 2017-06-22 PROCEDURE — 99214 OFFICE O/P EST MOD 30 MIN: CPT | Mod: S$GLB,,, | Performed by: NURSE PRACTITIONER

## 2017-06-22 PROCEDURE — 99999 PR PBB SHADOW E&M-EST. PATIENT-LVL III: CPT | Mod: PBBFAC,,, | Performed by: NURSE PRACTITIONER

## 2017-06-22 RX ORDER — INSULIN GLARGINE 300 [IU]/ML
27 INJECTION, SOLUTION SUBCUTANEOUS NIGHTLY
Qty: 3 SYRINGE | Refills: 11 | Status: SHIPPED | OUTPATIENT
Start: 2017-06-22 | End: 2017-06-26

## 2017-06-22 NOTE — PROGRESS NOTES
Subjective:       Patient ID: Mark Patino is a 62 y.o. male.    Chief Complaint: Diabetes    Mr. Patino presents to the clinic today for follow up for diabetes.  His last Hemoglobin A1c was 11 and he was started on Tujeo.  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  His creatinine is 1.6.  He was out of metformin for months before last visit and was drinking soft drinks and fruit punch daily.  Brings blood sugar log which shows blood sugars are still uncontrolled.  Cholesterol was very uncontrolled at last visit and statin was changed to pravastatin.  He saw diabetic education.  He also states he was taking vitamin D supplement every day instead of once per week.      Diabetes   He presents for his follow-up diabetic visit. He has type 2 diabetes mellitus. His disease course has been worsening. There are no hypoglycemic associated symptoms. There are no diabetic associated symptoms. Pertinent negatives for diabetes include no chest pain, no polydipsia, no polyphagia and no polyuria. There are no hypoglycemic complications. Symptoms are stable. Diabetic complications include nephropathy. Risk factors for coronary artery disease include dyslipidemia and male sex. Current diabetic treatment includes insulin injections and oral agent (monotherapy). He is compliant with treatment some of the time. His weight is increasing steadily. He is following a generally unhealthy diet. Diabetic meal planning: cut out soft drinks/sugary drinks. He has had a previous visit with a dietitian. He monitors urine at home 1-2 x per day. Blood glucose monitoring compliance is adequate. His home blood glucose trend is decreasing steadily. His breakfast blood glucose range is generally 140-180 mg/dl. An ACE inhibitor/angiotensin II receptor blocker is being taken.     Review of Systems   Constitutional: Negative for chills and fever.   HENT: Negative for congestion, ear pain and sinus pressure.    Eyes: Negative for  visual disturbance.   Respiratory: Negative for cough, shortness of breath and wheezing.    Cardiovascular: Negative for chest pain, palpitations and leg swelling.   Gastrointestinal: Negative for abdominal pain, constipation and diarrhea.   Endocrine: Negative for polydipsia, polyphagia and polyuria.       Objective:      Physical Exam   Constitutional: He is oriented to person, place, and time. He appears well-developed and well-nourished. No distress.   HENT:   Head: Normocephalic and atraumatic.   Right Ear: External ear normal.   Left Ear: External ear normal.   Mouth/Throat: Oropharynx is clear and moist. No oropharyngeal exudate.   Eyes: Pupils are equal, round, and reactive to light. Right eye exhibits no discharge. Left eye exhibits no discharge.   Neck: Neck supple. No thyromegaly present.   Cardiovascular: Normal rate and regular rhythm.  Exam reveals no gallop and no friction rub.    No murmur heard.  Pulmonary/Chest: Effort normal and breath sounds normal. No respiratory distress. He has no wheezes. He has no rales.   Abdominal: Soft. He exhibits no distension. There is no tenderness.   Lymphadenopathy:     He has no cervical adenopathy.   Neurological: He is alert and oriented to person, place, and time. Coordination normal.   Skin: Skin is warm and dry.   Psychiatric: He has a normal mood and affect. His behavior is normal. Thought content normal.   Vitals reviewed.          Current Outpatient Prescriptions:     aspirin 81 MG Chew, Take 1 tablet (81 mg total) by mouth once daily., Disp: , Rfl: 0    ergocalciferol (ERGOCALCIFEROL) 50,000 unit Cap, Take 1 capsule (50,000 Units total) by mouth every 7 days., Disp: 90 capsule, Rfl: 1    insulin glargine, TOUJEO, (TOUJEO SOLOSTAR) 300 unit/mL (1.5 mL) InPn pen, Inject 27 Units into the skin every evening., Disp: 3 Syringe, Rfl: 11    lisinopril (PRINIVIL,ZESTRIL) 2.5 MG tablet, Take 1 tablet (2.5 mg total) by mouth once daily., Disp: 90 tablet, Rfl:  "3    pen needle, diabetic (BD ULTRA-FINE MACKENZIE PEN NEEDLES) 32 gauge x 5/32" Ndle, 1 each by Misc.(Non-Drug; Combo Route) route once daily at 6am., Disp: 300 each, Rfl: 11    pravastatin (PRAVACHOL) 40 MG tablet, Take 1 tablet (40 mg total) by mouth once daily., Disp: 90 tablet, Rfl: 3    blood sugar diagnostic Strp, 1 each by Misc.(Non-Drug; Combo Route) route 2 (two) times daily., Disp: 100 each, Rfl: 5    empagliflozin (JARDIANCE) 10 mg Tab, Take 10 mg by mouth once daily., Disp: 30 tablet, Rfl: 5  Assessment:       1. Diabetes mellitus due to underlying condition, uncontrolled, with other diabetic kidney complication, with long-term current use of insulin    2. Hypovitaminosis D    3. Obesity, Class II, BMI 35.0-39.9, with comorbidity (see actual BMI)    4. Non compliance with medical treatment        Plan:     Diabetes mellitus due to underlying condition, uncontrolled, with other diabetic kidney complication, with long-term current use of insulin  Reinforced stopping sugary drinks.  Increase Tujeo today.  Stop metformin and add Jardiance.    Monitor blood sugars BID and RTC 2 weeks.  Advised he needs insulin to control the diabetes at this time.  -     Comprehensive metabolic panel; Future; Expected date: 06/22/2017  -     Hemoglobin A1c; Future; Expected date: 06/22/2017  -     Lipid panel; Future; Expected date: 06/22/2017  -     insulin glargine, TOUJEO, (TOUJEO SOLOSTAR) 300 unit/mL (1.5 mL) InPn pen; Inject 27 Units into the skin every evening.  Dispense: 3 Syringe; Refill: 11  -     blood sugar diagnostic Strp; 1 each by Misc.(Non-Drug; Combo Route) route 2 (two) times daily.  Dispense: 100 each; Refill: 5  -     empagliflozin (JARDIANCE) 10 mg Tab; Take 10 mg by mouth once daily.  Dispense: 30 tablet; Refill: 5    Hypovitaminosis D  -     Vitamin D; Future; Expected date: 06/22/2017    Obesity, Class II, BMI 35.0-39.9, with comorbidity (see actual BMI)    Non compliance with medical " treatment  Reports compliance is improving.    Patient readiness: acceptance and barriers:readiness    During the course of the visit the patient was educated and counseled about the following:     Diabetes:  Educational material distributed.  Suggested low cholesterol diet.  Increased dose of insulin: 27 units.  Discontinued metformin; see  medication orders.  Obesity:   Diet interventions: moderate (500 kCal/d) deficit diet.    Goals: Diabetes: Maintain Hemoglobin A1C below 7 and Obesity: Reduce calorie intake and BMI    Did patient meet goals/outcomes: No    The following self management tools provided: blood glucose log    Patient Instructions (the written plan) was given to the patient/family.     Time spent with patient: 30 minutes

## 2017-06-22 NOTE — TELEPHONE ENCOUNTER
Reason for Disposition   Caller has NON-URGENT medication question about med that PCP prescribed and triager unable to answer question    Protocols used: ST MEDICATION QUESTION CALL-A-FANG    Mark was told to switch from metformin to jardiance but the jardiance is $100 so he was not able to pick that up from the pharmacy today. He needs something more affordable. He is asking the office to call him tomorrow with recommended changes. Please contact caller with any further care advice.

## 2017-06-22 NOTE — PATIENT INSTRUCTIONS
Empagliflozin oral tablets  What is this medicine?  EMPAGLIGLOZIN (EM pa gli FLOE zin) helps to treat type 2 diabetes. It helps to control blood sugar. Treatment is combined with diet and exercise.  How should I use this medicine?  Take this medicine by mouth with a glass of water. Follow the directions on the prescription label. Take it in the morning, with or without food. Take your dose at the same time each day. Do not take more often than directed. Do not stop taking except on your doctor's advice.  Talk to your pediatrician regarding the use of this medicine in children. Special care may be needed.  What side effects may I notice from receiving this medicine?  Side effects that you should report to your doctor or health care professional as soon as possible:  · allergic reactions like skin rash, itching or hives, swelling of the face, lips, or tongue  · breathing problems  · dizziness  · fast or irregular heartbeat  · feeling faint or lightheaded, falls  · muscle weakness  · nausea, vomiting, unusual stomach upset or pain  · signs and symptoms of low blood sugar such as feeling anxious, confusion, dizziness, increased hunger, unusually weak or tired, sweating, shakiness, cold, irritable, headache, blurred vision, fast heartbeat, loss of consciousness  · signs and symptoms of a urinary tract infection, such as fever, chills, a burning feeling when urinating, blood in the urine, back pain  · trouble passing urine or change in the amount of urine, including an urgent need to urinate more often, in larger amounts, or at night  · penile discharge, itching, or pain in men  · unusual tiredness  · vaginal discharge, itching, or odor in women  Side effects that usually do not require medical attention (Report these to your doctor or health care professional if they continue or are bothersome.):  · joint pain  · mild increase in urination  · thirsty  What may interact with this medicine?  Do not take this medicine  with any of the following medications:  · gatifloxacin  This medicine may also interact with the following medications:  · alcohol  · certain medicines for blood pressure, heart disease  · diuretics  What if I miss a dose?  If you miss a dose, take it as soon as you can. If it is almost time for your next dose, take only that dose. Do not take double or extra doses.  Where should I keep my medicine?  Keep out of the reach of children.  Store at room temperature between 20 and 25 degrees C (68 and 77 degrees F). Throw away any unused medicine after the expiration date.  What should I tell my health care provider before I take this medicine?  They need to know if you have any of these conditions:  · dehydration  · diabetic ketoacidosis  · diet low in salt  · eating less due to illness, surgery, dieting, or any other reason  · having surgery  · high cholesterol  · high levels of potassium in the blood  · history of pancreatitis or pancreas problems  · history of yeast infection of the penis or vagina  · if you often drink alcohol  · infections in the bladder, kidneys, or urinary tract  · kidney disease  · liver disease  · low blood pressure  · on hemodialysis  · problems urinating  · type 1 diabetes  · uncircumcised male  · an unusual or allergic reaction to empagliflozin, other medicines, foods, dyes, or preservatives  · pregnant or trying to get pregnant  · breast-feeding  What should I watch for while using this medicine?  Visit your doctor or health care professional for regular checks on your progress.  This medicine can cause a serious condition in which there is too much acid in the blood. If you develop nausea, vomiting, stomach pain, unusual tiredness, or breathing problems, stop taking this medicine and call your doctor right away. If possible, use a ketone dipstick to check for ketones in your urine.  A test called the HbA1C (A1C) will be monitored. This is a simple blood test. It measures your blood sugar  control over the last 2 to 3 months. You will receive this test every 3 to 6 months.  Learn how to check your blood sugar. Learn the symptoms of low and high blood sugar and how to manage them.  Always carry a quick-source of sugar with you in case you have symptoms of low blood sugar. Examples include hard sugar candy or glucose tablets. Make sure others know that you can choke if you eat or drink when you develop serious symptoms of low blood sugar, such as seizures or unconsciousness. They must get medical help at once.  Tell your doctor or health care professional if you have high blood sugar. You might need to change the dose of your medicine. If you are sick or exercising more than usual, you might need to change the dose of your medicine.  Do not skip meals. Ask your doctor or health care professional if you should avoid alcohol. Many nonprescription cough and cold products contain sugar or alcohol. These can affect blood sugar.  Wear a medical ID bracelet or chain, and carry a card that describes your disease and details of your medicine and dosage times.  Date Last Reviewed:   NOTE:This sheet is a summary. It may not cover all possible information. If you have questions about this medicine, talk to your doctor, pharmacist, or health care provider. Copyright© 2016 [employer]      CHECK BLOOD SUGAR TWICE DAILY.  BRING DAILY BLOOD SUGAR LOG TO CLINIC  IN TWO WEEKS.  NO SUGARY DRINKS.

## 2017-06-23 ENCOUNTER — LAB VISIT (OUTPATIENT)
Dept: LAB | Facility: HOSPITAL | Age: 62
End: 2017-06-23
Attending: NURSE PRACTITIONER
Payer: COMMERCIAL

## 2017-06-23 ENCOUNTER — TELEPHONE (OUTPATIENT)
Dept: FAMILY MEDICINE | Facility: CLINIC | Age: 62
End: 2017-06-23

## 2017-06-23 DIAGNOSIS — E55.9 HYPOVITAMINOSIS D: ICD-10-CM

## 2017-06-23 LAB — 25(OH)D3+25(OH)D2 SERPL-MCNC: 40 NG/ML

## 2017-06-23 PROCEDURE — 36415 COLL VENOUS BLD VENIPUNCTURE: CPT | Mod: PO

## 2017-06-23 PROCEDURE — 82306 VITAMIN D 25 HYDROXY: CPT

## 2017-06-23 NOTE — TELEPHONE ENCOUNTER
----- Message from Emerson Meléndez sent at 6/23/2017  4:44 PM CDT -----  Contact: Self  Hello, Patient wanted to know can someone give him a call regarding his prescription. He stated that the medicine is too expensive and to see if he can get the generic brand for cheap.

## 2017-06-23 NOTE — TELEPHONE ENCOUNTER
Please notify patient I switched medication from jardiance to Januvia.  Let us know if he has any problems affording this.

## 2017-06-24 ENCOUNTER — NURSE TRIAGE (OUTPATIENT)
Dept: ADMINISTRATIVE | Facility: CLINIC | Age: 62
End: 2017-06-24

## 2017-06-24 NOTE — TELEPHONE ENCOUNTER
Reason for Disposition   Blood glucose > 500 mg/dl (27.5 mmol/l)    Protocols used: ST DIABETES - HIGH BLOOD SUGAR-A-    Patient's wife called and states he unable to afford the medication for his diabetes and his blood sugar is over 500. Instructed her that patient should go to ED now.

## 2017-06-26 ENCOUNTER — TELEPHONE (OUTPATIENT)
Dept: FAMILY MEDICINE | Facility: CLINIC | Age: 62
End: 2017-06-26

## 2017-06-26 RX ORDER — CHOLECALCIFEROL (VITAMIN D3) 125 MCG
1 TABLET ORAL DAILY
Qty: 90 TABLET | Refills: 3 | Status: SHIPPED | OUTPATIENT
Start: 2017-06-26 | End: 2021-12-20 | Stop reason: SDUPTHER

## 2017-06-26 RX ORDER — INSULIN ASPART 100 [IU]/ML
INJECTION, SUSPENSION SUBCUTANEOUS
Qty: 10 SYRINGE | Refills: 0 | Status: SHIPPED | OUTPATIENT
Start: 2017-06-26 | End: 2017-06-26 | Stop reason: ALTCHOICE

## 2017-06-26 RX ORDER — REPAGLINIDE 1 MG/1
1 TABLET ORAL
Qty: 180 TABLET | Refills: 3 | Status: SHIPPED | OUTPATIENT
Start: 2017-06-26 | End: 2021-02-10

## 2017-06-26 RX ORDER — INSULIN GLARGINE 300 [IU]/ML
27 INJECTION, SOLUTION SUBCUTANEOUS NIGHTLY
Qty: 3 SYRINGE | Refills: 11 | Status: SHIPPED | OUTPATIENT
Start: 2017-06-26 | End: 2017-09-20 | Stop reason: SDUPTHER

## 2017-06-26 NOTE — TELEPHONE ENCOUNTER
Patient states insulin Toujeo is expensive and he can't afford the cost. He is inquiring if there is an alternate insulin he can use. Please prescribe.

## 2017-06-26 NOTE — TELEPHONE ENCOUNTER
----- Message from Cee Fontenot sent at 6/26/2017 10:38 AM CDT -----  Contact: Patient  Patient called requesting a cheaper medication,can't afford medicine. Didn't remember medication name Please call back at [phone] to advise. Thanks,

## 2017-06-26 NOTE — TELEPHONE ENCOUNTER
Spoke to patient who states it is Januvia that is too expensive. States he is taking Toujeo but would still like to  the coupon offered to him earlier today. Patient states his blood sugar today was 159, verbalizes following diabetic diet. Patient advised about  and pharmacy assistance per Dr. Mcfadden.

## 2017-06-26 NOTE — TELEPHONE ENCOUNTER
I need to know what is the last blood sugars? Is he still taking Toujeo? Is he following the diet? I could send Pranding with his meals. was sent. Also I sent a pharmacist for better price for hisToujeo.

## 2017-06-26 NOTE — TELEPHONE ENCOUNTER
Patient states prescription for Januvia is too expensive. Requesting a prescription for something more affordable.

## 2017-06-26 NOTE — TELEPHONE ENCOUNTER
I was unable to reach him. Obesity, Class II, BMI 35.0-39.9, with comorbidity (see actual BMI)  -     Ambulatory referral to Endocrinology  -     Ambulatory referral to Outpatient Case Management  -     Ambulatory Referral to Pharmacy Assistance    Uncontrolled type 2 diabetes mellitus without complication, with long-term current use of insulin  -     Ambulatory referral to Endocrinology  -     Ambulatory referral to Outpatient Case Management  -     Ambulatory Referral to Pharmacy Assistance

## 2017-06-27 ENCOUNTER — LAB VISIT (OUTPATIENT)
Dept: LAB | Facility: HOSPITAL | Age: 62
End: 2017-06-27
Attending: FAMILY MEDICINE
Payer: COMMERCIAL

## 2017-06-27 DIAGNOSIS — Z12.11 COLON CANCER SCREENING: ICD-10-CM

## 2017-06-27 PROCEDURE — 82270 OCCULT BLOOD FECES: CPT | Mod: 91

## 2017-06-28 ENCOUNTER — OUTPATIENT CASE MANAGEMENT (OUTPATIENT)
Dept: ADMINISTRATIVE | Facility: OTHER | Age: 62
End: 2017-06-28

## 2017-06-28 ENCOUNTER — TELEPHONE (OUTPATIENT)
Dept: FAMILY MEDICINE | Facility: CLINIC | Age: 62
End: 2017-06-28

## 2017-06-28 LAB
OB PNL STL: NEGATIVE

## 2017-06-28 NOTE — PROGRESS NOTES
Please note the following patients information has been forwarded to [facility] for Case Management or .    Please see the media section in patient's chart for additional details.    Please contact Outpatient Complex care Management at ext [phone] with any questions.    Thank you,    Fariba Lares, SSC

## 2017-06-28 NOTE — TELEPHONE ENCOUNTER
----- Message from Fariba Lares sent at 6/28/2017  3:31 PM CDT -----  Please note the following patients information has been forwarded to [employer] for Case Management or .     Please see the media section in patient's chart for additional details.     Please contact Outpatient Complex care Management at ext [phone] with any questions.     Thank you,     Fariba Lares, SSC

## 2017-06-30 ENCOUNTER — TELEPHONE (OUTPATIENT)
Dept: FAMILY MEDICINE | Facility: CLINIC | Age: 62
End: 2017-06-30

## 2017-06-30 NOTE — TELEPHONE ENCOUNTER
----- Message from Emerson Meléndez sent at 6/27/2017  8:56 AM CDT -----  Contact: patient spouse  Patient needs a prescription card for medications Januvia 100mg tab [employer] and Jardiance 10mg tab [facility]HANNA. Can you please give patient a call ASAP. [phone]. Thank You

## 2017-07-06 ENCOUNTER — OFFICE VISIT (OUTPATIENT)
Dept: FAMILY MEDICINE | Facility: CLINIC | Age: 62
End: 2017-07-06
Payer: COMMERCIAL

## 2017-07-06 VITALS
TEMPERATURE: 98 F | SYSTOLIC BLOOD PRESSURE: 94 MMHG | BODY MASS INDEX: 32.64 KG/M2 | DIASTOLIC BLOOD PRESSURE: 62 MMHG | HEART RATE: 79 BPM | WEIGHT: 215.38 LBS | HEIGHT: 68 IN

## 2017-07-06 DIAGNOSIS — E66.9 OBESITY (BMI 30-39.9): ICD-10-CM

## 2017-07-06 PROCEDURE — 4010F ACE/ARB THERAPY RXD/TAKEN: CPT | Mod: S$GLB,,, | Performed by: NURSE PRACTITIONER

## 2017-07-06 PROCEDURE — 99999 PR PBB SHADOW E&M-EST. PATIENT-LVL III: CPT | Mod: PBBFAC,,, | Performed by: NURSE PRACTITIONER

## 2017-07-06 PROCEDURE — 99213 OFFICE O/P EST LOW 20 MIN: CPT | Mod: S$GLB,,, | Performed by: NURSE PRACTITIONER

## 2017-07-06 PROCEDURE — 3046F HEMOGLOBIN A1C LEVEL >9.0%: CPT | Mod: S$GLB,,, | Performed by: NURSE PRACTITIONER

## 2017-07-06 RX ORDER — EMPAGLIFLOZIN 10 MG/1
TABLET, FILM COATED ORAL
COMMUNITY
Start: 2017-07-02 | End: 2017-07-06

## 2017-07-06 NOTE — PROGRESS NOTES
Subjective:       Patient ID: Mark Patino is a 62 y.o. male.    Chief Complaint: Diabetes    Mr. Patino presents to the clinic today for follow up for diabetes.  He states he is feeling better; CBG's in AM are .  No low blood sugars.        Diabetes   He presents for his follow-up diabetic visit. He has type 2 diabetes mellitus. His disease course has been improving. There are no hypoglycemic associated symptoms. There are no diabetic associated symptoms. Pertinent negatives for diabetes include no chest pain. There are no hypoglycemic complications. Symptoms are improving. Diabetic complications include nephropathy. Risk factors for coronary artery disease include diabetes mellitus, dyslipidemia and male sex. Current diabetic treatment includes insulin injections and oral agent (monotherapy). He is compliant with treatment all of the time. His weight is decreasing steadily. He is following a generally healthy diet. Meal planning includes avoidance of concentrated sweets. He participates in exercise intermittently. He monitors blood glucose at home 1-2 x per day. His home blood glucose trend is decreasing rapidly. His breakfast blood glucose is taken between 7-8 am. His breakfast blood glucose range is generally  mg/dl. An ACE inhibitor/angiotensin II receptor blocker is being taken. Eye exam is current.     Review of Systems   Constitutional: Negative for chills and fever.   HENT: Negative for congestion, ear pain and sinus pressure.    Eyes: Negative for visual disturbance.   Respiratory: Negative for cough, shortness of breath and wheezing.    Cardiovascular: Negative for chest pain, palpitations and leg swelling.   Gastrointestinal: Negative for abdominal pain, constipation and diarrhea.       Objective:      Physical Exam   Constitutional: He is oriented to person, place, and time. He appears well-developed and well-nourished. No distress.   HENT:   Head: Normocephalic and atraumatic.   Right  "Ear: External ear normal.   Left Ear: External ear normal.   Mouth/Throat: Oropharynx is clear and moist. No oropharyngeal exudate.   Eyes: Pupils are equal, round, and reactive to light. Right eye exhibits no discharge. Left eye exhibits no discharge.   Neck: Neck supple. No thyromegaly present.   Cardiovascular: Normal rate and regular rhythm.  Exam reveals no gallop and no friction rub.    No murmur heard.  Pulmonary/Chest: Effort normal and breath sounds normal. No respiratory distress. He has no wheezes. He has no rales.   Lymphadenopathy:     He has no cervical adenopathy.   Neurological: He is alert and oriented to person, place, and time. Coordination normal.   Skin: Skin is warm and dry.   Psychiatric: He has a normal mood and affect. His behavior is normal. Thought content normal.   Vitals reviewed.          Current Outpatient Prescriptions:     aspirin 81 MG Chew, Take 1 tablet (81 mg total) by mouth once daily., Disp: , Rfl: 0    blood sugar diagnostic Strp, 1 each by Misc.(Non-Drug; Combo Route) route 2 (two) times daily., Disp: 100 each, Rfl: 5    insulin glargine, TOUJEO, (TOUJEO SOLOSTAR) 300 unit/mL (1.5 mL) InPn pen, Inject 27 Units into the skin every evening., Disp: 3 Syringe, Rfl: 11    lisinopril (PRINIVIL,ZESTRIL) 2.5 MG tablet, Take 1 tablet (2.5 mg total) by mouth once daily., Disp: 90 tablet, Rfl: 3    pen needle, diabetic (BD ULTRA-FINE MACKENZIE PEN NEEDLES) 32 gauge x 5/32" Ndle, 1 each by Misc.(Non-Drug; Combo Route) route once daily at 6am., Disp: 300 each, Rfl: 11    pravastatin (PRAVACHOL) 40 MG tablet, Take 1 tablet (40 mg total) by mouth once daily., Disp: 90 tablet, Rfl: 3    repaglinide (PRANDIN) 1 MG tablet, Take 1 tablet (1 mg total) by mouth 2 (two) times daily before meals., Disp: 180 tablet, Rfl: 3    ergocalciferol, vitamin D2, 2,000 unit Tab, Take 1 tablet by mouth Daily., Disp: 90 tablet, Rfl: 3  Assessment:       1. Uncontrolled type 2 diabetes mellitus with diabetic " nephropathy, with long-term current use of insulin    2. Obesity (BMI 30-39.9)        Plan:       Uncontrolled type 2 diabetes mellitus with diabetic nephropathy, with long-term current use of insulin  Improving quickly.    Reinforced diabetic diet.   He has stopped drinking soda.  RTC 2 mos with labs.    Obesity (BMI 30-39.9)  Improving.     Patient readiness: acceptance and barriers:none    During the course of the visit the patient was educated and counseled about the following:     Diabetes:  Discussed general issues about diabetes pathophysiology and management.  Addressed ADA diet.  Obesity:   Diet interventions: moderate (500 kCal/d) deficit diet.    Goals: Diabetes: Maintain Hemoglobin A1C below 7 and Obesity: Reduce calorie intake and BMI    Did patient meet goals/outcomes: No    The following self management tools provided: blood glucose log    Patient Instructions (the written plan) was given to the patient/family.     Time spent with patient: 15 minutes

## 2017-07-06 NOTE — PATIENT INSTRUCTIONS
Healthy Meals for Diabetes     A healthcare provider will help you develop a meal plan that fits your needs.   Ask your healthcare team to help you make a meal plan that fits your needs. Your meal plan tells you when to eat your meals and snacks, what kinds of foods to eat, and how much of each food to eat. You dont have to give up all the foods you like. But you do need to follow some guidelines.  Choose healthy carbohydrates  Starches, sugars, and fiber are all types of carbohydrates. Fiber can help lower your cholesterol and triglycerides. Fiber is also healthy for your heart. You should have 20 to 35 grams of total fiber each day. Fiber-rich foods include:  · Whole-grain breads and cereals  · Bulgur wheat  · Brown rice     · Whole-wheat pasta  · Fruits and vegetables  · Dry beans, and peas   Keep track of the amount of carbohydrates you eat. This can help you keep the right balance of physical activity and medicine. The amount of carbohydrates needed will vary for each person. It depends on many things such as your health, the medicines you take, and how active you are. Your healthcare team will help you figure out the right amount of carbohydrates for you. You may start with around 45 to 60 grams of carbohydrates per meal, depending on your situation.   Here are some examples of foods containing about 15 grams of carbohydrates (1 serving of carbohydrates):  · 1/2 cup of canned or frozen fruit  · A small piece of fresh fruit (4 ounces)  · 1 slice of bread  · 1/2 cup of oatmeal  · 1/3 cup of rice  · 4 to 6 crackers  · 1/2 English muffin  · 1/2 cup of black beans  · 1/4 of a large baked potato (3 ounces)  · 2/3 cup of plain fat-free yogurt  · 1 cup of soup  · 1/2 cup of casserole  · 6 chicken nuggets  · 2-inch-square brownie or cake without frosting  · 2 small cookies  · 1/2 cup of ice cream or sherbet  Choose healthy protein foods  Eating protein that is low in fat can help you control your weight. It also  helps keep your heart healthy. Low-fat protein foods include:  · Fish  · Plant proteins, such as dry beans and peas, nuts, and soy products like tofu and soymilk  · Lean meat with all visible fat removed  · Poultry with the skin removed  · Low-fat or nonfat milk, cheese, and yogurt  Limit unhealthy fats and sugar  Saturated and trans fats are unhealthy for your heart. They raise LDL (bad) cholesterol. Fat is also high in calories, so it can make you gain weight. To cut down on unhealthy fats and sugar, limit these foods:  · Butter or margarine  · Palm and palm kernel oils and coconut oil  · Cream  · Cheese  · Bhatti  · Lunch meats     · Ice cream  · Sweet bakery goods such as pies, muffins, and donuts  · Jams and jellies  · Candy bars  · Regular sodas   How much to eat  The amount of food you eat affects your blood sugar. It also affects your weight. Your healthcare team will tell you how much of each type of food you should eat.  · Use measuring cups and spoons and a food scale to measure serving sizes.  · Learn what a correct serving size looks like on your plate. This will help when you are away from home and cant measure your servings.  · Eat only the number of servings given on your meal plan for each food. Dont take seconds.  · Learn to read food labels. Be sure to look at serving size, total carbohydrates, fiber, calories, sugar, and saturated and trans fats. Look for healthier alternatives to foods that have added sugar.  · Plan ahead for parties so you can still have a good time without going overboard with unhealthy food choices. Set a good example yourself by bringing a healthy dish to pot lucks.   Choose healthy snacks  When it comes to snacks, we usually think about foods with added sugar and fats. But there are many other options for healthier snack choices. Here are a few snack ideas to choose from:  Snacks with less than 5 grams of carbohydrates  · 1 piece of string cheese  · 3 celery sticks plus 1  tablespoon of peanut butter  · 5 cherry tomatoes plus 1 tablespoon of ranch dressing  · 1 hard-boiled egg  · 1/4 cup of fresh blueberries  ·  5 baby carrots  · 1 cup of light popcorn  · 1/2 cup of sugar-free gelatin  · 15 almonds  Snacks with about 10 to 20 grams of carbohydrates  · 1/3 cup of hummus plus 1 cup of fresh cut nonstarchy vegetables (carrots, green peppers, broccoli, celery, or a combination)  · 1/2 cup of fresh or canned fruit plus 1/4 cup of cottage cheese  · 1/2 cup of tuna salad with 4 crackers  · 2 rice cakes and a tablespoon of peanut butter  · 1 small apple or orange  · 3 cups light popcorn  · 1/2 of a turkey sandwich (1 slice of whole-wheat bread, 2 ounces of turkey, and mustard)  Portion sizes are important to controlling your blood sugar and staying at a healthy weight. Stock up on healthy snack items so you always have them on hand.  When to eat  Your meal plan will likely include breakfast, lunch, dinner, and some snacks.  · Try to eat your meals and snacks at about the same times each day.  · Eat all your meals and snacks. Skipping a meal or snack can make your blood sugar drop too low. It can also cause you to eat too much at the next meal or snack. Then your blood sugar could get too high.  Date Last Reviewed: 7/1/2016  © 2500-4920 [employer]. [address] [place] [address], [place] [address]. All rights reserved. This information is not intended as a substitute for professional medical care. Always follow your healthcare professional's instructions.

## 2017-07-10 ENCOUNTER — TELEPHONE (OUTPATIENT)
Dept: PHARMACY | Facility: CLINIC | Age: 62
End: 2017-07-10

## 2017-07-25 ENCOUNTER — TELEPHONE (OUTPATIENT)
Dept: PHARMACY | Facility: CLINIC | Age: 62
End: 2017-07-25

## 2017-08-06 NOTE — TELEPHONE ENCOUNTER
Stop Januvia, will send Prandin to take with meals twice daily.  Continue Sheri.     No significant past surgical history

## 2017-08-24 DIAGNOSIS — Z12.11 COLON CANCER SCREENING: ICD-10-CM

## 2017-09-13 ENCOUNTER — DOCUMENTATION ONLY (OUTPATIENT)
Dept: FAMILY MEDICINE | Facility: CLINIC | Age: 62
End: 2017-09-13

## 2017-09-13 NOTE — PROGRESS NOTES
Pre-Visit Chart Review  For Appointment Scheduled on 9/14/17    Health Maintenance Due   Topic Date Due    Hepatitis C Screening  1955    Colonoscopy  05/03/2005    Zoster Vaccine  05/03/2015    Influenza Vaccine  08/01/2017    Hemoglobin A1c  09/01/2017

## 2017-09-14 ENCOUNTER — OFFICE VISIT (OUTPATIENT)
Dept: FAMILY MEDICINE | Facility: CLINIC | Age: 62
End: 2017-09-14
Payer: COMMERCIAL

## 2017-09-14 VITALS
WEIGHT: 213.63 LBS | HEART RATE: 71 BPM | BODY MASS INDEX: 32.38 KG/M2 | OXYGEN SATURATION: 98 % | SYSTOLIC BLOOD PRESSURE: 108 MMHG | DIASTOLIC BLOOD PRESSURE: 74 MMHG | TEMPERATURE: 99 F | HEIGHT: 68 IN | RESPIRATION RATE: 18 BRPM

## 2017-09-14 DIAGNOSIS — Z11.59 NEED FOR HEPATITIS C SCREENING TEST: ICD-10-CM

## 2017-09-14 DIAGNOSIS — E66.9 OBESITY (BMI 30-39.9): ICD-10-CM

## 2017-09-14 PROCEDURE — 3008F BODY MASS INDEX DOCD: CPT | Mod: S$GLB,,, | Performed by: NURSE PRACTITIONER

## 2017-09-14 PROCEDURE — 99213 OFFICE O/P EST LOW 20 MIN: CPT | Mod: S$GLB,,, | Performed by: NURSE PRACTITIONER

## 2017-09-14 PROCEDURE — 3046F HEMOGLOBIN A1C LEVEL >9.0%: CPT | Mod: S$GLB,,, | Performed by: NURSE PRACTITIONER

## 2017-09-14 PROCEDURE — 99999 PR PBB SHADOW E&M-EST. PATIENT-LVL IV: CPT | Mod: PBBFAC,,, | Performed by: NURSE PRACTITIONER

## 2017-09-14 PROCEDURE — 4010F ACE/ARB THERAPY RXD/TAKEN: CPT | Mod: S$GLB,,, | Performed by: NURSE PRACTITIONER

## 2017-09-14 NOTE — PATIENT INSTRUCTIONS
Diabetes: Activity Tips    Being more active can help you manage your diabetes. The tips on this sheet can help you get the most from your exercise. They can also help you stay safe.  Staying Active  Its important for adults to spend less time sitting and being inactive. This is especially true if you have type 2 diabetes. When you are sitting for long periods of time, get up for short sessions of light activity every 30 minutes.  You should aim for at least 150 minutes a week of exercise or physical activity. Dont let more than 2 days go by without being active.  Benefit from briskness  Brisk activity gets your heart beating faster. This can help you increase your fitness, lose extra weight, and manage your blood sugar level. Try brisk walking. Or, if you have foot or leg problems, you can try swimming or bike riding. You can break up your exercise into chunks throughout the day. Work up to at least 30 minutes of steady, brisk exercise on most days.  Warm up and cool down  Warming up and cooling down reduce your risk of injury. They also help limit muscle soreness. Do a mild version of your activity for 5 minutes before and after your routine. You can also learn stretches that will help keep your muscles loose. Your healthcare provider may show you good ways to warm up and stretch.  Do the talk-sing test  The talk-sing test is a simple way to tell how hard youre exercising. If you can talk while exercising, youre in a safe range. If youre out of breath, slow down. If you can carry a tune, its time to  the pace. Walk up a hill. Increase the resistance on your stationary bike. Or swim faster.  What about eating?  You may be told to plan your exercise for 1 to 2 hours after a meal. In most cases, you dont need to eat while being active. If you take insulin or medicine that can cause low blood sugar, test your blood sugar before exercising. And carry a fast-acting sugar that will raise your blood sugar  level quickly. This includes glucose tablets or hard candy. Use it if you feel low blood sugar symptoms.  Safety tips  These tips can help you stay safe as you become fit:  · Exercise with a friend or carry a cell phone if you have one.  · Carry or wear identification, such as a necklace or bracelet, that says you have diabetes.  · Use the proper footwear and safety equipment for your activity.  · Drink water before, during, and after exercise.  · Dress properly for the weather.  · Dont exercise in very hot or very cold weather.  · Dont exercise if you are sick.  · If you are instructed to do so, test your blood sugar before and after you exercise. Have a small carbohydrate snack if your blood sugar is low before you start exercising.   When to stop exercising and call your healthcare provider  Stop exercising and call your healthcare provider right away if you notice any of the following:  · Pain, pressure, tightness, or heaviness in the chest  · Pain or heaviness in the neck, shoulders, back, arms, legs, or feet  · Unusual shortness of breath  · Dizziness or lightheadedness  · Unusually rapid or slow pulse  · Increased joint or muscle pain  · Nausea or vomiting  Date Last Reviewed: 5/1/2016  © 7177-5198 [employer]. [address] [place] [address], [place] [address]. All rights reserved. This information is not intended as a substitute for professional medical care. Always follow your healthcare professional's instructions.

## 2017-09-17 NOTE — PROGRESS NOTES
Subjective:       Patient ID: Mark Patino is a 62 y.o. male.    Chief Complaint: Follow-up    Mr. Patino presents to the clinic today for diabetes follow up.  He has been doing well on medication regimen with fasting blood sugars in low 100's.  He ran out of Januvia and Jardiance one week ago and could not afford to refill these.  He is reluctant to try another medication because these have been working so well.  He denies any new problems.        Review of Systems   Constitutional: Negative for chills and fever.   HENT: Negative for congestion, ear pain and sinus pressure.    Respiratory: Negative for cough, shortness of breath and wheezing.    Cardiovascular: Negative for chest pain, palpitations and leg swelling.   Gastrointestinal: Negative for abdominal pain, constipation and diarrhea.   Endocrine: Negative for polydipsia, polyphagia and polyuria.   Neurological: Negative for dizziness and light-headedness.       Objective:      Physical Exam   Constitutional: He is oriented to person, place, and time. He appears well-developed and well-nourished. No distress.   HENT:   Head: Normocephalic and atraumatic.   Right Ear: External ear normal.   Left Ear: External ear normal.   Mouth/Throat: Oropharynx is clear and moist. No oropharyngeal exudate.   Eyes: Pupils are equal, round, and reactive to light. Right eye exhibits no discharge. Left eye exhibits no discharge.   Neck: Neck supple. No thyromegaly present.   Cardiovascular: Normal rate and regular rhythm.  Exam reveals no gallop and no friction rub.    No murmur heard.  Pulmonary/Chest: Effort normal and breath sounds normal. No respiratory distress. He has no wheezes. He has no rales.   Abdominal: Soft. He exhibits no distension. There is no tenderness.   Lymphadenopathy:     He has no cervical adenopathy.   Neurological: He is alert and oriented to person, place, and time. Coordination normal.   Skin: Skin is warm and dry.   Psychiatric: He has a normal  "mood and affect. His behavior is normal. Thought content normal.   Vitals reviewed.          Current Outpatient Prescriptions:     aspirin 81 MG Chew, Take 1 tablet (81 mg total) by mouth once daily., Disp: , Rfl: 0    blood sugar diagnostic Strp, 1 each by Misc.(Non-Drug; Combo Route) route 2 (two) times daily., Disp: 100 each, Rfl: 5    empagliflozin (JARDIANCE) 10 mg Tab, Take 10 mg by mouth once daily., Disp: , Rfl:     ergocalciferol, vitamin D2, 2,000 unit Tab, Take 1 tablet by mouth Daily., Disp: 90 tablet, Rfl: 3    insulin glargine, TOUJEO, (TOUJEO SOLOSTAR) 300 unit/mL (1.5 mL) InPn pen, Inject 27 Units into the skin every evening., Disp: 3 Syringe, Rfl: 11    pen needle, diabetic (BD ULTRA-FINE MACKENZIE PEN NEEDLES) 32 gauge x 5/32" Ndle, 1 each by Misc.(Non-Drug; Combo Route) route once daily at 6am., Disp: 300 each, Rfl: 11    pravastatin (PRAVACHOL) 40 MG tablet, Take 1 tablet (40 mg total) by mouth once daily., Disp: 90 tablet, Rfl: 3    lisinopril (PRINIVIL,ZESTRIL) 2.5 MG tablet, Take 1 tablet (2.5 mg total) by mouth once daily., Disp: 90 tablet, Rfl: 3    repaglinide (PRANDIN) 1 MG tablet, Take 1 tablet (1 mg total) by mouth 2 (two) times daily before meals., Disp: 180 tablet, Rfl: 3  Assessment:       1. Uncontrolled type 2 diabetes mellitus with diabetic nephropathy, with long-term current use of insulin    2. Obesity (BMI 30-39.9)        Plan:       Uncontrolled type 2 diabetes mellitus with diabetic nephropathy, with long-term current use of insulin  Coupons given for Jardiance and Januvia x 12 mos.    Continue current medications.  He will have fasting labs which were ordered at last visit.    Obesity (BMI 30-39.9)  Weight loss of 2 lb since last visit.    Patient readiness: acceptance and barriers:readiness    During the course of the visit the patient was educated and counseled about the following:     Diabetes:  Discussed general issues about diabetes pathophysiology and " management.  Addressed ADA diet.  Obesity:   Diet interventions: moderate (500 kCal/d) deficit diet and qualitative changes (increase low-fat,  high-fiber foods).    Goals: Diabetes: Maintain Hemoglobin A1C below 7 and Obesity: Reduce calorie intake and BMI    Did patient meet goals/outcomes: Yes    The following self management tools provided: declined    Patient Instructions (the written plan) was given to the patient/family.     Time spent with patient: 15 minutes

## 2017-09-19 ENCOUNTER — TELEPHONE (OUTPATIENT)
Dept: FAMILY MEDICINE | Facility: CLINIC | Age: 62
End: 2017-09-19

## 2017-09-19 NOTE — TELEPHONE ENCOUNTER
Call placed to patient at [phone] and [phone], no answer received. Obtained forms which were left by patient to be completed by PCP for assistance with prescription payment. No name of the medication noted on form or in message. Left message on voicemail to return call to office.

## 2017-09-19 NOTE — TELEPHONE ENCOUNTER
----- Message from Shirlene Devlin RT sent at 9/19/2017 10:15 AM CDT -----  Contact: Cait ([facility]) [phone]  Called pod, Cait ([facility]) [phone], returned missed call for the pt, boo.

## 2017-09-19 NOTE — TELEPHONE ENCOUNTER
----- Message from Otilia Crockett sent at 9/19/2017  9:58 AM CDT -----  Returning your call.  Please call [phone].

## 2017-09-19 NOTE — TELEPHONE ENCOUNTER
Call placed to patient regarding medication assistance application he presented to office on yesterday. Patient requested his wife be contacted for further information.

## 2017-09-19 NOTE — TELEPHONE ENCOUNTER
----- Message from Emerson Meléndez sent at 9/18/2017  5:16 PM CDT -----  Contact: Self  Patient asked for someone to give him a call regarding his prescription refill. Patient insurance is no longer paying for his prescription so now his medicine is 900 dollars. There is a form that he dropped off for Dr. Mcfadden to filled out that can help with his prescriptions. His spouse asked for someone to please call her asap regarding this matter because she have additional questions that she need answered. [phone].

## 2017-09-19 NOTE — TELEPHONE ENCOUNTER
Spoke to patient's wife (Cait) who states patient's insurance increased from $244/month to $900/month, patient unable to afford that amount. States she received an application for assistance with the cost of Toujeo and Jardiance. Forms placed on Dr. Mcfadden's desk for review/completion.

## 2017-09-20 RX ORDER — INSULIN GLARGINE 300 [IU]/ML
27 INJECTION, SOLUTION SUBCUTANEOUS NIGHTLY
Qty: 5 SYRINGE | Refills: 11 | Status: SHIPPED | OUTPATIENT
Start: 2017-09-20 | End: 2021-02-08 | Stop reason: ALTCHOICE

## 2017-09-20 NOTE — TELEPHONE ENCOUNTER
Form for prescription assistance reviewed by Dr. Mcfadden. Form pertains only to Toujeo, does not pertain to Jardiance. Sections of form which pertains to physician were completed, sections pertaining to patient's personal income were left blank for patient to complete. Prescription for Toujeo printed and attached to assistance form. All of the above was fully explained to patient. Advised forms placed at  and are available for .

## 2017-09-29 ENCOUNTER — TELEPHONE (OUTPATIENT)
Dept: FAMILY MEDICINE | Facility: CLINIC | Age: 62
End: 2017-09-29

## 2017-09-29 NOTE — TELEPHONE ENCOUNTER
Patient notified Toujeo covered by [employer] until September 2018. Also notified first shipment will come to office. Office will call to notify patient when medication arrives.

## 2017-09-29 NOTE — TELEPHONE ENCOUNTER
----- Message from Jimena Platt sent at 9/29/2017  1:18 PM CDT -----  Contact: self [phone]  Call placed to pod. Patient returned your call, please call back.  Thank you!

## 2017-09-29 NOTE — TELEPHONE ENCOUNTER
----- Message from Ghazal Purdy sent at 9/28/2017  2:18 PM CDT -----  Contact: SELF   Patient came by to follow up on his  insulin glargine, TOUJEO, (TOUJEO SOLOSTAR) 300 unit/mL (1.5 mL). He said that the pharmacy told him that his insurance will not cover the Toujeo. Please call the patient because he said that he has been out of this insulin for 2 weeks and he has been coming here to follow up on this. Phone # [phone].

## 2017-09-29 NOTE — TELEPHONE ENCOUNTER
----- Message from Edison Keane sent at 9/29/2017 10:49 AM CDT -----  Contact: [phone]  Patient requesting a refill on insulin, been out of insulin for 2 weeks.    Patient will be using   [id number]  [place] - [address]  [place] [address]  Phone: [phone] Fax: [phone]    Please call patient at [phone].     Thanks!

## 2017-09-29 NOTE — TELEPHONE ENCOUNTER
Received fax from [employer] patient assistance program stating patient is eligible to receive Toujeo through the [employer] Patient Connection Program through 09-. The first supply of this product will be shipped directly to the office. Call placed to patient for notification, no answer received. Left message to call office.

## 2017-10-03 ENCOUNTER — TELEPHONE (OUTPATIENT)
Dept: FAMILY MEDICINE | Facility: CLINIC | Age: 62
End: 2017-10-03

## 2017-10-03 NOTE — TELEPHONE ENCOUNTER
----- Message from Marley Stanford sent at 10/3/2017  4:08 PM CDT -----  Contact: pt  Pt is calling about a medicine card that is supposed to be coming to the office for him...[phone] (home)

## 2017-10-03 NOTE — TELEPHONE ENCOUNTER
Delivery of Toujeo has not been received as of yet. Patient notified. Verbalized understanding. Assured patient upon delivery staff will contact him to  medication.

## 2017-10-05 ENCOUNTER — TELEPHONE (OUTPATIENT)
Dept: FAMILY MEDICINE | Facility: CLINIC | Age: 62
End: 2017-10-05

## 2017-10-05 NOTE — TELEPHONE ENCOUNTER
Received shipment of Toujeo from [employer]. Patient notified medication available for  at office. Verbalized understanding.

## 2018-03-12 DIAGNOSIS — Z79.4 TYPE 2 DIABETES MELLITUS WITH COMPLICATION, WITH LONG-TERM CURRENT USE OF INSULIN: Primary | ICD-10-CM

## 2018-03-12 DIAGNOSIS — E11.8 TYPE 2 DIABETES MELLITUS WITH COMPLICATION, WITH LONG-TERM CURRENT USE OF INSULIN: Primary | ICD-10-CM

## 2018-03-13 ENCOUNTER — OUTPATIENT CASE MANAGEMENT (OUTPATIENT)
Dept: ADMINISTRATIVE | Facility: OTHER | Age: 63
End: 2018-03-13

## 2018-03-13 NOTE — PROGRESS NOTES
Thank you for the referral.  Patient has been assigned to TUYET Chavez for low risk screening for Outpatient Case Management.     Reason for referral:  Type 2 diabetes mellitus with complication, with long-term current use of insulin    Please contact [facility] at [phone]. [phone] with any questions.    Thank you,    Catherine Meza LCSW, CCM

## 2018-03-14 ENCOUNTER — OUTPATIENT CASE MANAGEMENT (OUTPATIENT)
Dept: ADMINISTRATIVE | Facility: OTHER | Age: 63
End: 2018-03-14

## 2018-03-14 ENCOUNTER — TELEPHONE (OUTPATIENT)
Dept: FAMILY MEDICINE | Facility: CLINIC | Age: 63
End: 2018-03-14

## 2018-03-14 NOTE — PROGRESS NOTES
2nd attempt    This CSW attempted to reach patient/caregiver to provide resource and left msg requesting a return call.  Letter with contact information was sent via US Mail  to patient.  Referral source notified.

## 2018-03-14 NOTE — TELEPHONE ENCOUNTER
----- Message from TUYET Cornejo sent at 3/14/2018  1:49 PM CDT -----  This CSW received a referral on the above patient. I have attempted to contact the patient or caregiver by phone two times unsuccessfully and mailed a letter with our contact information.    Thank you for the referral,    TUYET Chavez

## 2018-03-14 NOTE — LETTER
March 14, 2018    Mark Patino  [address] [facility] [place] [address]             [facility] [address] [facility] [address] Dear:Mark Patino    I am writing from the Outpatient Complex Care Management Department at [facility].  I received a referral from Dr. Oskar Mcfadden to contact you or your caregiver regarding any needs you may have. I have attempted to contact you or your cargeiver by phone two times unsuccessfully.  Please contact the Outpatient Complex Care Management Department at [phone] if you would like to discuss your needs.      Sincerely,         TUYET Chavez

## 2018-03-15 ENCOUNTER — DOCUMENTATION ONLY (OUTPATIENT)
Dept: FAMILY MEDICINE | Facility: CLINIC | Age: 63
End: 2018-03-15

## 2018-03-15 NOTE — PROGRESS NOTES
Pre-Visit Chart Review  For Appointment Scheduled on 03/15/2018    Health Maintenance Due   Topic Date Due    Hepatitis C Screening  1955    Colonoscopy  05/03/2005    Influenza Vaccine  08/01/2017    Hemoglobin A1c  09/01/2017

## 2018-07-10 NOTE — PROGRESS NOTES
HPI     Presenting Complaint: Pt here today for yearly diabetic eye exam.    Hemoglobin A1C       Date                     Value               Ref Range             Status                06/01/2017               11.0 (H)            4.5 - 6.2 %           Final                02/09/2012               9.8 (H)             4.0 - 6.2 %           Final                 12/30/2011               8.5 (H)             4.0 - 6.2 %           Final            ----------    DLE  10 +  Years    (+) OTC readers for small print. Pt states distance vision is stable.     (-) headaches  (-) diplopia   (-) flashes / (-) floaters      Last edited by Declan Purdy, OD on 6/13/2017  3:46 PM. (History)        ROS     Positive for: Endocrine (DM type 2), Cardiovascular (HTN), Eyes    Negative for: Constitutional, Gastrointestinal, Neurological, Skin,   Genitourinary, Musculoskeletal, HENT, Respiratory, Psychiatric,   Allergic/Imm, Heme/Lymph    Last edited by Declan Purdy, OD on 6/13/2017  3:46 PM. (History)        Assessment /Plan     For exam results, see Encounter Report.    Diabetes mellitus without ophthalmic manifestations    Cortical cataract    Nuclear sclerosis, bilateral    Refractive error      DM type 2 w/o ocular retinopathy OU. Discussed possible ocular affects of uncontrolled blood sugar with patient. Recommended continued strong blood sugar control and continued care with PCP. Monitor yearly.     Mild to moderate cataracts OU. Discussed possible ocular affects of cataracts. Acceptable BCVA OU. Discussed treatment options. Surgery not recommended at this time. Monitor yearly.     Pt happy with uncorrected distance vision and OTC readers prn for near, recommend OTC +2.50 readers. Return prn for spec Rx.      RTC in 1 year for comprehensive eye exam, or sooner prn.                    
Awake/Patient baseline mental status/Alert and oriented to person, place and time/Symptoms improved

## 2018-08-13 LAB — MICROALBUMIN/CREATININE RATIO: 2.2 UG/MG

## 2018-08-20 LAB — HCV AB SER-ACNC: NEGATIVE

## 2019-01-14 ENCOUNTER — PATIENT OUTREACH (OUTPATIENT)
Dept: ADMINISTRATIVE | Facility: HOSPITAL | Age: 64
End: 2019-01-14

## 2019-01-14 DIAGNOSIS — E11.9 TYPE 2 DIABETES MELLITUS WITHOUT COMPLICATION, WITHOUT LONG-TERM CURRENT USE OF INSULIN: Primary | ICD-10-CM

## 2019-01-17 ENCOUNTER — TELEPHONE (OUTPATIENT)
Dept: FAMILY MEDICINE | Facility: CLINIC | Age: 64
End: 2019-01-17

## 2019-01-17 DIAGNOSIS — Z12.11 COLON CANCER SCREENING: ICD-10-CM

## 2019-01-17 NOTE — TELEPHONE ENCOUNTER
Attempted to contact patient on several occasions, the first few calls someone would answer and hang up. Left voicemail instructing patient visited scheduled on 12/21/19 is scheduled incorrectly. Needs to rescheduled.

## 2021-01-06 ENCOUNTER — TELEPHONE (OUTPATIENT)
Dept: FAMILY MEDICINE | Facility: CLINIC | Age: 66
End: 2021-01-06

## 2021-01-12 ENCOUNTER — OFFICE VISIT (OUTPATIENT)
Dept: FAMILY MEDICINE | Facility: CLINIC | Age: 66
End: 2021-01-12
Payer: COMMERCIAL

## 2021-01-12 VITALS
DIASTOLIC BLOOD PRESSURE: 82 MMHG | HEIGHT: 68 IN | BODY MASS INDEX: 30.91 KG/M2 | HEART RATE: 90 BPM | SYSTOLIC BLOOD PRESSURE: 136 MMHG | WEIGHT: 203.94 LBS | TEMPERATURE: 97 F

## 2021-01-12 PROCEDURE — 1101F PR PT FALLS ASSESS DOC 0-1 FALLS W/OUT INJ PAST YR: ICD-10-PCS | Mod: CPTII,S$GLB,, | Performed by: NURSE PRACTITIONER

## 2021-01-12 PROCEDURE — 99204 PR OFFICE/OUTPT VISIT, NEW, LEVL IV, 45-59 MIN: ICD-10-PCS | Mod: S$GLB,,, | Performed by: NURSE PRACTITIONER

## 2021-01-12 PROCEDURE — 1101F PT FALLS ASSESS-DOCD LE1/YR: CPT | Mod: CPTII,S$GLB,, | Performed by: NURSE PRACTITIONER

## 2021-01-12 PROCEDURE — 3008F BODY MASS INDEX DOCD: CPT | Mod: CPTII,S$GLB,, | Performed by: NURSE PRACTITIONER

## 2021-01-12 PROCEDURE — 99999 PR PBB SHADOW E&M-EST. PATIENT-LVL IV: ICD-10-PCS | Mod: PBBFAC,,, | Performed by: NURSE PRACTITIONER

## 2021-01-12 PROCEDURE — 3288F PR FALLS RISK ASSESSMENT DOCUMENTED: ICD-10-PCS | Mod: CPTII,S$GLB,, | Performed by: NURSE PRACTITIONER

## 2021-01-12 PROCEDURE — 1126F AMNT PAIN NOTED NONE PRSNT: CPT | Mod: S$GLB,,, | Performed by: NURSE PRACTITIONER

## 2021-01-12 PROCEDURE — 3008F PR BODY MASS INDEX (BMI) DOCUMENTED: ICD-10-PCS | Mod: CPTII,S$GLB,, | Performed by: NURSE PRACTITIONER

## 2021-01-12 PROCEDURE — 1126F PR PAIN SEVERITY QUANTIFIED, NO PAIN PRESENT: ICD-10-PCS | Mod: S$GLB,,, | Performed by: NURSE PRACTITIONER

## 2021-01-12 PROCEDURE — 99999 PR PBB SHADOW E&M-EST. PATIENT-LVL IV: CPT | Mod: PBBFAC,,, | Performed by: NURSE PRACTITIONER

## 2021-01-12 PROCEDURE — 99204 OFFICE O/P NEW MOD 45 MIN: CPT | Mod: S$GLB,,, | Performed by: NURSE PRACTITIONER

## 2021-01-12 PROCEDURE — 3288F FALL RISK ASSESSMENT DOCD: CPT | Mod: CPTII,S$GLB,, | Performed by: NURSE PRACTITIONER

## 2021-01-23 ENCOUNTER — LAB VISIT (OUTPATIENT)
Dept: LAB | Facility: HOSPITAL | Age: 66
End: 2021-01-23
Attending: NURSE PRACTITIONER
Payer: COMMERCIAL

## 2021-01-23 LAB
ALBUMIN SERPL BCP-MCNC: 4 G/DL (ref 3.5–5.2)
ALP SERPL-CCNC: 374 U/L (ref 55–135)
ALT SERPL W/O P-5'-P-CCNC: 23 U/L (ref 10–44)
ANION GAP SERPL CALC-SCNC: 10 MMOL/L (ref 8–16)
AST SERPL-CCNC: 20 U/L (ref 10–40)
BASOPHILS # BLD AUTO: 0.06 K/UL (ref 0–0.2)
BASOPHILS NFR BLD: 0.7 % (ref 0–1.9)
BILIRUB SERPL-MCNC: 0.7 MG/DL (ref 0.1–1)
BUN SERPL-MCNC: 13 MG/DL (ref 8–23)
CALCIUM SERPL-MCNC: 9.6 MG/DL (ref 8.7–10.5)
CHLORIDE SERPL-SCNC: 103 MMOL/L (ref 95–110)
CHOLEST SERPL-MCNC: 190 MG/DL (ref 120–199)
CHOLEST/HDLC SERPL: 6.1 {RATIO} (ref 2–5)
CO2 SERPL-SCNC: 25 MMOL/L (ref 23–29)
CREAT SERPL-MCNC: 1.1 MG/DL (ref 0.5–1.4)
DIFFERENTIAL METHOD: ABNORMAL
EOSINOPHIL # BLD AUTO: 0.1 K/UL (ref 0–0.5)
EOSINOPHIL NFR BLD: 1.1 % (ref 0–8)
ERYTHROCYTE [DISTWIDTH] IN BLOOD BY AUTOMATED COUNT: 13.8 % (ref 11.5–14.5)
EST. GFR  (AFRICAN AMERICAN): >60 ML/MIN/1.73 M^2
EST. GFR  (NON AFRICAN AMERICAN): >60 ML/MIN/1.73 M^2
ESTIMATED AVG GLUCOSE: 232 MG/DL (ref 68–131)
GLUCOSE SERPL-MCNC: 212 MG/DL (ref 70–110)
HBA1C MFR BLD HPLC: 9.7 % (ref 4–5.6)
HCT VFR BLD AUTO: 43.3 % (ref 40–54)
HDLC SERPL-MCNC: 31 MG/DL (ref 40–75)
HDLC SERPL: 16.3 % (ref 20–50)
HGB BLD-MCNC: 14.8 G/DL (ref 14–18)
IMM GRANULOCYTES # BLD AUTO: 0.02 K/UL (ref 0–0.04)
IMM GRANULOCYTES NFR BLD AUTO: 0.2 % (ref 0–0.5)
LDLC SERPL CALC-MCNC: 142.8 MG/DL (ref 63–159)
LYMPHOCYTES # BLD AUTO: 2.7 K/UL (ref 1–4.8)
LYMPHOCYTES NFR BLD: 30.2 % (ref 18–48)
MCH RBC QN AUTO: 26.4 PG (ref 27–31)
MCHC RBC AUTO-ENTMCNC: 34.2 G/DL (ref 32–36)
MCV RBC AUTO: 77 FL (ref 82–98)
MONOCYTES # BLD AUTO: 0.7 K/UL (ref 0.3–1)
MONOCYTES NFR BLD: 8.1 % (ref 4–15)
NEUTROPHILS # BLD AUTO: 5.4 K/UL (ref 1.8–7.7)
NEUTROPHILS NFR BLD: 59.7 % (ref 38–73)
NONHDLC SERPL-MCNC: 159 MG/DL
NRBC BLD-RTO: 0 /100 WBC
PLATELET # BLD AUTO: 365 K/UL (ref 150–350)
PMV BLD AUTO: 11 FL (ref 9.2–12.9)
POTASSIUM SERPL-SCNC: 4.4 MMOL/L (ref 3.5–5.1)
PROT SERPL-MCNC: 7.5 G/DL (ref 6–8.4)
RBC # BLD AUTO: 5.61 M/UL (ref 4.6–6.2)
SODIUM SERPL-SCNC: 138 MMOL/L (ref 136–145)
TRIGL SERPL-MCNC: 81 MG/DL (ref 30–150)
TSH SERPL DL<=0.005 MIU/L-ACNC: 1.29 UIU/ML (ref 0.4–4)
WBC # BLD AUTO: 8.98 K/UL (ref 3.9–12.7)

## 2021-01-23 PROCEDURE — 85025 COMPLETE CBC W/AUTO DIFF WBC: CPT

## 2021-01-23 PROCEDURE — 84443 ASSAY THYROID STIM HORMONE: CPT

## 2021-01-23 PROCEDURE — 36415 COLL VENOUS BLD VENIPUNCTURE: CPT | Mod: PO

## 2021-01-23 PROCEDURE — 83036 HEMOGLOBIN GLYCOSYLATED A1C: CPT

## 2021-01-23 PROCEDURE — 80053 COMPREHEN METABOLIC PANEL: CPT

## 2021-01-23 PROCEDURE — 80061 LIPID PANEL: CPT

## 2021-02-01 ENCOUNTER — CLINICAL SUPPORT (OUTPATIENT)
Dept: DIABETES | Facility: CLINIC | Age: 66
End: 2021-02-01
Payer: COMMERCIAL

## 2021-02-01 ENCOUNTER — PATIENT OUTREACH (OUTPATIENT)
Dept: ADMINISTRATIVE | Facility: HOSPITAL | Age: 66
End: 2021-02-01

## 2021-02-01 PROCEDURE — G0108 PR DIAB MANAGE TRN  PER INDIV: ICD-10-PCS | Mod: S$GLB,,, | Performed by: NUTRITIONIST

## 2021-02-01 PROCEDURE — 99999 PR PBB SHADOW E&M-EST. PATIENT-LVL III: ICD-10-PCS | Mod: PBBFAC,,, | Performed by: NUTRITIONIST

## 2021-02-01 PROCEDURE — 99999 PR PBB SHADOW E&M-EST. PATIENT-LVL III: CPT | Mod: PBBFAC,,, | Performed by: NUTRITIONIST

## 2021-02-01 PROCEDURE — G0108 DIAB MANAGE TRN  PER INDIV: HCPCS | Mod: S$GLB,,, | Performed by: NUTRITIONIST

## 2021-02-08 ENCOUNTER — OFFICE VISIT (OUTPATIENT)
Dept: FAMILY MEDICINE | Facility: CLINIC | Age: 66
End: 2021-02-08
Payer: COMMERCIAL

## 2021-02-08 ENCOUNTER — OFFICE VISIT (OUTPATIENT)
Dept: OPHTHALMOLOGY | Facility: CLINIC | Age: 66
End: 2021-02-08
Payer: COMMERCIAL

## 2021-02-08 VITALS
WEIGHT: 201.06 LBS | TEMPERATURE: 98 F | DIASTOLIC BLOOD PRESSURE: 68 MMHG | SYSTOLIC BLOOD PRESSURE: 124 MMHG | HEART RATE: 80 BPM | HEIGHT: 68 IN | BODY MASS INDEX: 30.47 KG/M2

## 2021-02-08 DIAGNOSIS — Z00.00 ROUTINE MEDICAL EXAM: Primary | ICD-10-CM

## 2021-02-08 DIAGNOSIS — H25.013 CORTICAL AGE-RELATED CATARACT, BILATERAL: ICD-10-CM

## 2021-02-08 DIAGNOSIS — E11.9 TYPE 2 DIABETES MELLITUS WITHOUT RETINOPATHY: Primary | ICD-10-CM

## 2021-02-08 PROCEDURE — 3288F FALL RISK ASSESSMENT DOCD: CPT | Mod: CPTII,S$GLB,, | Performed by: NURSE PRACTITIONER

## 2021-02-08 PROCEDURE — 3008F BODY MASS INDEX DOCD: CPT | Mod: CPTII,S$GLB,, | Performed by: NURSE PRACTITIONER

## 2021-02-08 PROCEDURE — 2023F DILAT RTA XM W/O RTNOPTHY: CPT | Mod: S$GLB,,, | Performed by: OPHTHALMOLOGY

## 2021-02-08 PROCEDURE — 99999 PR PBB SHADOW E&M-EST. PATIENT-LVL III: CPT | Mod: PBBFAC,,, | Performed by: OPHTHALMOLOGY

## 2021-02-08 PROCEDURE — 99999 PR PBB SHADOW E&M-EST. PATIENT-LVL III: CPT | Mod: PBBFAC,,, | Performed by: NURSE PRACTITIONER

## 2021-02-08 PROCEDURE — 99213 OFFICE O/P EST LOW 20 MIN: CPT | Mod: S$GLB,,, | Performed by: NURSE PRACTITIONER

## 2021-02-08 PROCEDURE — 92004 PR EYE EXAM, NEW PATIENT,COMPREHESV: ICD-10-PCS | Mod: S$GLB,,, | Performed by: OPHTHALMOLOGY

## 2021-02-08 PROCEDURE — 92004 COMPRE OPH EXAM NEW PT 1/>: CPT | Mod: S$GLB,,, | Performed by: OPHTHALMOLOGY

## 2021-02-08 PROCEDURE — 99999 PR PBB SHADOW E&M-EST. PATIENT-LVL III: ICD-10-PCS | Mod: PBBFAC,,, | Performed by: OPHTHALMOLOGY

## 2021-02-08 PROCEDURE — 1101F PT FALLS ASSESS-DOCD LE1/YR: CPT | Mod: CPTII,S$GLB,, | Performed by: OPHTHALMOLOGY

## 2021-02-08 PROCEDURE — 1126F PR PAIN SEVERITY QUANTIFIED, NO PAIN PRESENT: ICD-10-PCS | Mod: S$GLB,,, | Performed by: OPHTHALMOLOGY

## 2021-02-08 PROCEDURE — 99213 PR OFFICE/OUTPT VISIT, EST, LEVL III, 20-29 MIN: ICD-10-PCS | Mod: S$GLB,,, | Performed by: NURSE PRACTITIONER

## 2021-02-08 PROCEDURE — 2023F PR DILATED RETINAL EXAM W/O EVID OF RETINOPATHY: ICD-10-PCS | Mod: S$GLB,,, | Performed by: OPHTHALMOLOGY

## 2021-02-08 PROCEDURE — 3288F PR FALLS RISK ASSESSMENT DOCUMENTED: ICD-10-PCS | Mod: CPTII,S$GLB,, | Performed by: NURSE PRACTITIONER

## 2021-02-08 PROCEDURE — 1101F PR PT FALLS ASSESS DOC 0-1 FALLS W/OUT INJ PAST YR: ICD-10-PCS | Mod: CPTII,S$GLB,, | Performed by: OPHTHALMOLOGY

## 2021-02-08 PROCEDURE — 1126F AMNT PAIN NOTED NONE PRSNT: CPT | Mod: S$GLB,,, | Performed by: NURSE PRACTITIONER

## 2021-02-08 PROCEDURE — 1101F PR PT FALLS ASSESS DOC 0-1 FALLS W/OUT INJ PAST YR: ICD-10-PCS | Mod: CPTII,S$GLB,, | Performed by: NURSE PRACTITIONER

## 2021-02-08 PROCEDURE — 99999 PR PBB SHADOW E&M-EST. PATIENT-LVL III: ICD-10-PCS | Mod: PBBFAC,,, | Performed by: NURSE PRACTITIONER

## 2021-02-08 PROCEDURE — 1126F PR PAIN SEVERITY QUANTIFIED, NO PAIN PRESENT: ICD-10-PCS | Mod: S$GLB,,, | Performed by: NURSE PRACTITIONER

## 2021-02-08 PROCEDURE — 3288F FALL RISK ASSESSMENT DOCD: CPT | Mod: CPTII,S$GLB,, | Performed by: OPHTHALMOLOGY

## 2021-02-08 PROCEDURE — 1101F PT FALLS ASSESS-DOCD LE1/YR: CPT | Mod: CPTII,S$GLB,, | Performed by: NURSE PRACTITIONER

## 2021-02-08 PROCEDURE — 1126F AMNT PAIN NOTED NONE PRSNT: CPT | Mod: S$GLB,,, | Performed by: OPHTHALMOLOGY

## 2021-02-08 PROCEDURE — 3288F PR FALLS RISK ASSESSMENT DOCUMENTED: ICD-10-PCS | Mod: CPTII,S$GLB,, | Performed by: OPHTHALMOLOGY

## 2021-02-08 PROCEDURE — 3008F PR BODY MASS INDEX (BMI) DOCUMENTED: ICD-10-PCS | Mod: CPTII,S$GLB,, | Performed by: NURSE PRACTITIONER

## 2021-02-08 RX ORDER — PEN NEEDLE, DIABETIC 30 GX3/16"
1 NEEDLE, DISPOSABLE MISCELLANEOUS NIGHTLY
Qty: 100 EACH | Refills: 11 | Status: SHIPPED | OUTPATIENT
Start: 2021-02-08 | End: 2021-03-26 | Stop reason: SDUPTHER

## 2021-02-08 RX ORDER — INSULIN DETEMIR 100 [IU]/ML
INJECTION, SOLUTION SUBCUTANEOUS
COMMUNITY
Start: 2021-01-15 | End: 2021-05-25 | Stop reason: SDUPTHER

## 2021-02-10 ENCOUNTER — OFFICE VISIT (OUTPATIENT)
Dept: ENDOCRINOLOGY | Facility: CLINIC | Age: 66
End: 2021-02-10
Payer: COMMERCIAL

## 2021-02-10 VITALS
BODY MASS INDEX: 30.64 KG/M2 | TEMPERATURE: 98 F | HEIGHT: 68 IN | WEIGHT: 202.19 LBS | SYSTOLIC BLOOD PRESSURE: 118 MMHG | OXYGEN SATURATION: 98 % | HEART RATE: 76 BPM | DIASTOLIC BLOOD PRESSURE: 60 MMHG

## 2021-02-10 DIAGNOSIS — N52.9 ERECTILE DYSFUNCTION, UNSPECIFIED ERECTILE DYSFUNCTION TYPE: ICD-10-CM

## 2021-02-10 DIAGNOSIS — E78.5 HYPERLIPIDEMIA, UNSPECIFIED HYPERLIPIDEMIA TYPE: ICD-10-CM

## 2021-02-10 DIAGNOSIS — E66.9 OBESITY (BMI 30-39.9): ICD-10-CM

## 2021-02-10 PROCEDURE — 3288F FALL RISK ASSESSMENT DOCD: CPT | Mod: CPTII,S$GLB,, | Performed by: PHYSICIAN ASSISTANT

## 2021-02-10 PROCEDURE — 3288F PR FALLS RISK ASSESSMENT DOCUMENTED: ICD-10-PCS | Mod: CPTII,S$GLB,, | Performed by: PHYSICIAN ASSISTANT

## 2021-02-10 PROCEDURE — 1101F PR PT FALLS ASSESS DOC 0-1 FALLS W/OUT INJ PAST YR: ICD-10-PCS | Mod: CPTII,S$GLB,, | Performed by: PHYSICIAN ASSISTANT

## 2021-02-10 PROCEDURE — 99213 PR OFFICE/OUTPT VISIT, EST, LEVL III, 20-29 MIN: ICD-10-PCS | Mod: S$GLB,,, | Performed by: PHYSICIAN ASSISTANT

## 2021-02-10 PROCEDURE — 1101F PT FALLS ASSESS-DOCD LE1/YR: CPT | Mod: CPTII,S$GLB,, | Performed by: PHYSICIAN ASSISTANT

## 2021-02-10 PROCEDURE — 3046F PR MOST RECENT HEMOGLOBIN A1C LEVEL > 9.0%: ICD-10-PCS | Mod: CPTII,S$GLB,, | Performed by: PHYSICIAN ASSISTANT

## 2021-02-10 PROCEDURE — 3008F BODY MASS INDEX DOCD: CPT | Mod: CPTII,S$GLB,, | Performed by: PHYSICIAN ASSISTANT

## 2021-02-10 PROCEDURE — 99999 PR PBB SHADOW E&M-EST. PATIENT-LVL IV: CPT | Mod: PBBFAC,,, | Performed by: PHYSICIAN ASSISTANT

## 2021-02-10 PROCEDURE — 1126F PR PAIN SEVERITY QUANTIFIED, NO PAIN PRESENT: ICD-10-PCS | Mod: S$GLB,,, | Performed by: PHYSICIAN ASSISTANT

## 2021-02-10 PROCEDURE — 99213 OFFICE O/P EST LOW 20 MIN: CPT | Mod: S$GLB,,, | Performed by: PHYSICIAN ASSISTANT

## 2021-02-10 PROCEDURE — 1126F AMNT PAIN NOTED NONE PRSNT: CPT | Mod: S$GLB,,, | Performed by: PHYSICIAN ASSISTANT

## 2021-02-10 PROCEDURE — 3046F HEMOGLOBIN A1C LEVEL >9.0%: CPT | Mod: CPTII,S$GLB,, | Performed by: PHYSICIAN ASSISTANT

## 2021-02-10 PROCEDURE — 99999 PR PBB SHADOW E&M-EST. PATIENT-LVL IV: ICD-10-PCS | Mod: PBBFAC,,, | Performed by: PHYSICIAN ASSISTANT

## 2021-02-10 PROCEDURE — 3008F PR BODY MASS INDEX (BMI) DOCUMENTED: ICD-10-PCS | Mod: CPTII,S$GLB,, | Performed by: PHYSICIAN ASSISTANT

## 2021-02-10 RX ORDER — ATORVASTATIN CALCIUM 20 MG/1
20 TABLET, FILM COATED ORAL DAILY
COMMUNITY
End: 2021-02-17 | Stop reason: SDUPTHER

## 2021-02-10 RX ORDER — ORAL SEMAGLUTIDE 3 MG/1
3 TABLET ORAL DAILY
Qty: 30 TABLET | Refills: 0 | Status: SHIPPED | OUTPATIENT
Start: 2021-02-10 | End: 2021-03-11

## 2021-02-17 RX ORDER — ATORVASTATIN CALCIUM 20 MG/1
20 TABLET, FILM COATED ORAL DAILY
Qty: 90 TABLET | Refills: 3 | Status: SHIPPED | OUTPATIENT
Start: 2021-02-17 | End: 2021-03-26 | Stop reason: SDUPTHER

## 2021-03-11 ENCOUNTER — TELEPHONE (OUTPATIENT)
Dept: ENDOCRINOLOGY | Facility: CLINIC | Age: 66
End: 2021-03-11

## 2021-03-11 RX ORDER — ORAL SEMAGLUTIDE 7 MG/1
7 TABLET ORAL DAILY
Qty: 30 TABLET | Refills: 5 | Status: CANCELLED | OUTPATIENT
Start: 2021-03-11

## 2021-03-15 RX ORDER — ORAL SEMAGLUTIDE 7 MG/1
7 TABLET ORAL DAILY
COMMUNITY
End: 2021-03-15 | Stop reason: SDUPTHER

## 2021-03-15 RX ORDER — ORAL SEMAGLUTIDE 7 MG/1
7 TABLET ORAL DAILY
Qty: 90 TABLET | Refills: 1 | Status: SHIPPED | OUTPATIENT
Start: 2021-03-15 | End: 2021-06-24 | Stop reason: SDUPTHER

## 2021-03-19 ENCOUNTER — PATIENT OUTREACH (OUTPATIENT)
Dept: ADMINISTRATIVE | Facility: OTHER | Age: 66
End: 2021-03-19

## 2021-03-19 ENCOUNTER — TELEPHONE (OUTPATIENT)
Dept: FAMILY MEDICINE | Facility: CLINIC | Age: 66
End: 2021-03-19

## 2021-03-19 DIAGNOSIS — Z12.11 ENCOUNTER FOR FIT (FECAL IMMUNOCHEMICAL TEST) SCREENING: Primary | ICD-10-CM

## 2021-03-26 DIAGNOSIS — E78.5 HYPERLIPIDEMIA, UNSPECIFIED HYPERLIPIDEMIA TYPE: ICD-10-CM

## 2021-03-26 RX ORDER — METFORMIN HYDROCHLORIDE 1000 MG/1
1000 TABLET ORAL DAILY
Qty: 180 TABLET | Refills: 3 | Status: SHIPPED | OUTPATIENT
Start: 2021-03-26 | End: 2021-03-29

## 2021-03-26 RX ORDER — PEN NEEDLE, DIABETIC 30 GX3/16"
1 NEEDLE, DISPOSABLE MISCELLANEOUS NIGHTLY
Qty: 100 EACH | Refills: 11 | Status: SHIPPED | OUTPATIENT
Start: 2021-03-26 | End: 2022-04-21

## 2021-03-26 RX ORDER — ATORVASTATIN CALCIUM 20 MG/1
20 TABLET, FILM COATED ORAL DAILY
Qty: 90 TABLET | Refills: 3 | Status: SHIPPED | OUTPATIENT
Start: 2021-03-26 | End: 2021-05-12

## 2021-03-29 RX ORDER — METFORMIN HYDROCHLORIDE 1000 MG/1
1000 TABLET ORAL DAILY
Qty: 180 TABLET | Refills: 3 | Status: SHIPPED | OUTPATIENT
Start: 2021-03-29 | End: 2021-11-05 | Stop reason: SDUPTHER

## 2021-04-28 ENCOUNTER — TELEPHONE (OUTPATIENT)
Dept: DIABETES | Facility: CLINIC | Age: 66
End: 2021-04-28

## 2021-05-08 ENCOUNTER — LAB VISIT (OUTPATIENT)
Dept: LAB | Facility: HOSPITAL | Age: 66
End: 2021-05-08
Attending: PHYSICIAN ASSISTANT
Payer: COMMERCIAL

## 2021-05-08 DIAGNOSIS — N52.9 ERECTILE DYSFUNCTION, UNSPECIFIED ERECTILE DYSFUNCTION TYPE: ICD-10-CM

## 2021-05-08 LAB
ALBUMIN SERPL BCP-MCNC: 3.8 G/DL (ref 3.5–5.2)
ALP SERPL-CCNC: 328 U/L (ref 55–135)
ALT SERPL W/O P-5'-P-CCNC: 23 U/L (ref 10–44)
ANION GAP SERPL CALC-SCNC: 8 MMOL/L (ref 8–16)
AST SERPL-CCNC: 20 U/L (ref 10–40)
BILIRUB SERPL-MCNC: 0.4 MG/DL (ref 0.1–1)
BUN SERPL-MCNC: 14 MG/DL (ref 8–23)
CALCIUM SERPL-MCNC: 9.8 MG/DL (ref 8.7–10.5)
CHLORIDE SERPL-SCNC: 106 MMOL/L (ref 95–110)
CHOLEST SERPL-MCNC: 177 MG/DL (ref 120–199)
CHOLEST/HDLC SERPL: 4.5 {RATIO} (ref 2–5)
CO2 SERPL-SCNC: 30 MMOL/L (ref 23–29)
CREAT SERPL-MCNC: 1 MG/DL (ref 0.5–1.4)
EST. GFR  (AFRICAN AMERICAN): >60 ML/MIN/1.73 M^2
EST. GFR  (NON AFRICAN AMERICAN): >60 ML/MIN/1.73 M^2
ESTIMATED AVG GLUCOSE: 134 MG/DL (ref 68–131)
GLUCOSE SERPL-MCNC: 93 MG/DL (ref 70–110)
HBA1C MFR BLD: 6.3 % (ref 4–5.6)
HDLC SERPL-MCNC: 39 MG/DL (ref 40–75)
HDLC SERPL: 22 % (ref 20–50)
LDLC SERPL CALC-MCNC: 126 MG/DL (ref 63–159)
NONHDLC SERPL-MCNC: 138 MG/DL
POTASSIUM SERPL-SCNC: 4.7 MMOL/L (ref 3.5–5.1)
PROT SERPL-MCNC: 7.3 G/DL (ref 6–8.4)
SODIUM SERPL-SCNC: 144 MMOL/L (ref 136–145)
TRIGL SERPL-MCNC: 60 MG/DL (ref 30–150)

## 2021-05-08 PROCEDURE — 80053 COMPREHEN METABOLIC PANEL: CPT | Performed by: PHYSICIAN ASSISTANT

## 2021-05-08 PROCEDURE — 84403 ASSAY OF TOTAL TESTOSTERONE: CPT | Performed by: PHYSICIAN ASSISTANT

## 2021-05-08 PROCEDURE — 83036 HEMOGLOBIN GLYCOSYLATED A1C: CPT | Performed by: PHYSICIAN ASSISTANT

## 2021-05-08 PROCEDURE — 36415 COLL VENOUS BLD VENIPUNCTURE: CPT | Mod: PO | Performed by: PHYSICIAN ASSISTANT

## 2021-05-08 PROCEDURE — 80061 LIPID PANEL: CPT | Performed by: PHYSICIAN ASSISTANT

## 2021-05-10 ENCOUNTER — PATIENT OUTREACH (OUTPATIENT)
Dept: ADMINISTRATIVE | Facility: OTHER | Age: 66
End: 2021-05-10

## 2021-05-12 ENCOUNTER — OFFICE VISIT (OUTPATIENT)
Dept: ENDOCRINOLOGY | Facility: CLINIC | Age: 66
End: 2021-05-12
Payer: COMMERCIAL

## 2021-05-12 VITALS
HEIGHT: 70 IN | DIASTOLIC BLOOD PRESSURE: 68 MMHG | BODY MASS INDEX: 28.75 KG/M2 | SYSTOLIC BLOOD PRESSURE: 122 MMHG | RESPIRATION RATE: 18 BRPM | HEART RATE: 75 BPM | TEMPERATURE: 99 F | WEIGHT: 200.81 LBS | OXYGEN SATURATION: 98 %

## 2021-05-12 DIAGNOSIS — E66.9 OBESITY (BMI 30-39.9): ICD-10-CM

## 2021-05-12 DIAGNOSIS — E78.5 HYPERLIPIDEMIA, UNSPECIFIED HYPERLIPIDEMIA TYPE: ICD-10-CM

## 2021-05-12 DIAGNOSIS — N52.9 ERECTILE DYSFUNCTION, UNSPECIFIED ERECTILE DYSFUNCTION TYPE: ICD-10-CM

## 2021-05-12 LAB — GLUCOSE SERPL-MCNC: 106 MG/DL (ref 70–110)

## 2021-05-12 PROCEDURE — 99499 RISK ADDL DX/OHS AUDIT: ICD-10-PCS | Mod: S$GLB,,, | Performed by: PHYSICIAN ASSISTANT

## 2021-05-12 PROCEDURE — 1126F PR PAIN SEVERITY QUANTIFIED, NO PAIN PRESENT: ICD-10-PCS | Mod: S$GLB,,, | Performed by: PHYSICIAN ASSISTANT

## 2021-05-12 PROCEDURE — 3044F PR MOST RECENT HEMOGLOBIN A1C LEVEL <7.0%: ICD-10-PCS | Mod: CPTII,S$GLB,, | Performed by: PHYSICIAN ASSISTANT

## 2021-05-12 PROCEDURE — 82962 GLUCOSE BLOOD TEST: CPT | Mod: S$GLB,,, | Performed by: PHYSICIAN ASSISTANT

## 2021-05-12 PROCEDURE — 99214 OFFICE O/P EST MOD 30 MIN: CPT | Mod: S$GLB,,, | Performed by: PHYSICIAN ASSISTANT

## 2021-05-12 PROCEDURE — 99999 PR PBB SHADOW E&M-EST. PATIENT-LVL IV: CPT | Mod: PBBFAC,,, | Performed by: PHYSICIAN ASSISTANT

## 2021-05-12 PROCEDURE — 99499 UNLISTED E&M SERVICE: CPT | Mod: S$GLB,,, | Performed by: PHYSICIAN ASSISTANT

## 2021-05-12 PROCEDURE — 3044F HG A1C LEVEL LT 7.0%: CPT | Mod: CPTII,S$GLB,, | Performed by: PHYSICIAN ASSISTANT

## 2021-05-12 PROCEDURE — 3008F PR BODY MASS INDEX (BMI) DOCUMENTED: ICD-10-PCS | Mod: CPTII,S$GLB,, | Performed by: PHYSICIAN ASSISTANT

## 2021-05-12 PROCEDURE — 3008F BODY MASS INDEX DOCD: CPT | Mod: CPTII,S$GLB,, | Performed by: PHYSICIAN ASSISTANT

## 2021-05-12 PROCEDURE — 82962 POCT GLUCOSE, HAND-HELD DEVICE: ICD-10-PCS | Mod: S$GLB,,, | Performed by: PHYSICIAN ASSISTANT

## 2021-05-12 PROCEDURE — 1126F AMNT PAIN NOTED NONE PRSNT: CPT | Mod: S$GLB,,, | Performed by: PHYSICIAN ASSISTANT

## 2021-05-12 PROCEDURE — 1159F PR MEDICATION LIST DOCUMENTED IN MEDICAL RECORD: ICD-10-PCS | Mod: S$GLB,,, | Performed by: PHYSICIAN ASSISTANT

## 2021-05-12 PROCEDURE — 99999 PR PBB SHADOW E&M-EST. PATIENT-LVL IV: ICD-10-PCS | Mod: PBBFAC,,, | Performed by: PHYSICIAN ASSISTANT

## 2021-05-12 PROCEDURE — 99214 PR OFFICE/OUTPT VISIT, EST, LEVL IV, 30-39 MIN: ICD-10-PCS | Mod: S$GLB,,, | Performed by: PHYSICIAN ASSISTANT

## 2021-05-12 PROCEDURE — 1159F MED LIST DOCD IN RCRD: CPT | Mod: S$GLB,,, | Performed by: PHYSICIAN ASSISTANT

## 2021-05-12 RX ORDER — ATORVASTATIN CALCIUM 40 MG/1
40 TABLET, FILM COATED ORAL DAILY
Qty: 90 TABLET | Refills: 3 | Status: SHIPPED | OUTPATIENT
Start: 2021-05-12 | End: 2022-05-06 | Stop reason: SDUPTHER

## 2021-05-12 RX ORDER — ATORVASTATIN CALCIUM 40 MG/1
40 TABLET, FILM COATED ORAL DAILY
Qty: 30 TABLET | Refills: 0 | Status: SHIPPED | OUTPATIENT
Start: 2021-05-12 | End: 2021-05-12

## 2021-05-14 ENCOUNTER — NUTRITION (OUTPATIENT)
Dept: DIABETES | Facility: CLINIC | Age: 66
End: 2021-05-14
Payer: COMMERCIAL

## 2021-05-14 PROCEDURE — 99999 PR PBB SHADOW E&M-EST. PATIENT-LVL II: ICD-10-PCS | Mod: PBBFAC,,, | Performed by: NUTRITIONIST

## 2021-05-14 PROCEDURE — G0108 PR DIAB MANAGE TRN  PER INDIV: ICD-10-PCS | Mod: S$GLB,,, | Performed by: NUTRITIONIST

## 2021-05-14 PROCEDURE — G0108 DIAB MANAGE TRN  PER INDIV: HCPCS | Mod: S$GLB,,, | Performed by: NUTRITIONIST

## 2021-05-14 PROCEDURE — 99999 PR PBB SHADOW E&M-EST. PATIENT-LVL II: CPT | Mod: PBBFAC,,, | Performed by: NUTRITIONIST

## 2021-05-17 LAB
ALBUMIN SERPL-MCNC: 4.2 G/DL (ref 3.6–5.1)
SHBG SERPL-SCNC: 37 NMOL/L (ref 22–77)
TESTOST FREE SERPL-MCNC: 61 PG/ML (ref 46–224)
TESTOST SERPL-MCNC: 490 NG/DL (ref 250–1100)
TESTOSTERONE.FREE+WB SERPL-MCNC: 117.4 NG/DL (ref 110–575)

## 2021-05-25 RX ORDER — INSULIN DETEMIR 100 [IU]/ML
INJECTION, SOLUTION SUBCUTANEOUS
Qty: 30 ML | Refills: 3 | Status: SHIPPED | OUTPATIENT
Start: 2021-05-25 | End: 2021-06-02 | Stop reason: SDUPTHER

## 2021-06-03 RX ORDER — INSULIN DETEMIR 100 [IU]/ML
INJECTION, SOLUTION SUBCUTANEOUS
Qty: 30 ML | Refills: 3 | Status: SHIPPED | OUTPATIENT
Start: 2021-06-03 | End: 2021-11-05 | Stop reason: SDUPTHER

## 2021-06-08 ENCOUNTER — TELEPHONE (OUTPATIENT)
Dept: ENDOCRINOLOGY | Facility: CLINIC | Age: 66
End: 2021-06-08

## 2021-06-24 RX ORDER — LANCETS 33 GAUGE
EACH MISCELLANEOUS
Qty: 200 EACH | Refills: 3 | Status: SHIPPED | OUTPATIENT
Start: 2021-06-24 | End: 2021-11-05 | Stop reason: SDUPTHER

## 2021-06-24 RX ORDER — LANCETS 33 GAUGE
EACH MISCELLANEOUS
Qty: 200 EACH | Refills: 3 | Status: SHIPPED | OUTPATIENT
Start: 2021-06-24 | End: 2021-06-24 | Stop reason: SDUPTHER

## 2021-06-24 RX ORDER — ORAL SEMAGLUTIDE 7 MG/1
7 TABLET ORAL DAILY
Qty: 90 TABLET | Refills: 1 | Status: SHIPPED | OUTPATIENT
Start: 2021-06-24 | End: 2021-11-05 | Stop reason: SDUPTHER

## 2021-06-25 RX ORDER — ISOPROPYL ALCOHOL 70 ML/100ML
1 SWAB TOPICAL 2 TIMES DAILY
Qty: 200 EACH | Refills: 3 | Status: SHIPPED | OUTPATIENT
Start: 2021-06-25 | End: 2021-11-05 | Stop reason: ALTCHOICE

## 2021-08-18 ENCOUNTER — OFFICE VISIT (OUTPATIENT)
Dept: ENDOCRINOLOGY | Facility: CLINIC | Age: 66
End: 2021-08-18
Payer: COMMERCIAL

## 2021-08-18 VITALS
BODY MASS INDEX: 27.75 KG/M2 | DIASTOLIC BLOOD PRESSURE: 70 MMHG | TEMPERATURE: 98 F | OXYGEN SATURATION: 98 % | SYSTOLIC BLOOD PRESSURE: 108 MMHG | HEART RATE: 71 BPM | WEIGHT: 193.81 LBS | HEIGHT: 70 IN

## 2021-08-18 DIAGNOSIS — E66.9 OBESITY (BMI 30-39.9): ICD-10-CM

## 2021-08-18 DIAGNOSIS — N52.9 ERECTILE DYSFUNCTION, UNSPECIFIED ERECTILE DYSFUNCTION TYPE: ICD-10-CM

## 2021-08-18 DIAGNOSIS — E78.5 HYPERLIPIDEMIA, UNSPECIFIED HYPERLIPIDEMIA TYPE: ICD-10-CM

## 2021-08-18 PROCEDURE — 99213 PR OFFICE/OUTPT VISIT, EST, LEVL III, 20-29 MIN: ICD-10-PCS | Mod: S$GLB,,, | Performed by: PHYSICIAN ASSISTANT

## 2021-08-18 PROCEDURE — 3074F SYST BP LT 130 MM HG: CPT | Mod: CPTII,S$GLB,, | Performed by: PHYSICIAN ASSISTANT

## 2021-08-18 PROCEDURE — 3078F DIAST BP <80 MM HG: CPT | Mod: CPTII,S$GLB,, | Performed by: PHYSICIAN ASSISTANT

## 2021-08-18 PROCEDURE — 1101F PT FALLS ASSESS-DOCD LE1/YR: CPT | Mod: CPTII,S$GLB,, | Performed by: PHYSICIAN ASSISTANT

## 2021-08-18 PROCEDURE — 3008F BODY MASS INDEX DOCD: CPT | Mod: CPTII,S$GLB,, | Performed by: PHYSICIAN ASSISTANT

## 2021-08-18 PROCEDURE — 3044F PR MOST RECENT HEMOGLOBIN A1C LEVEL <7.0%: ICD-10-PCS | Mod: CPTII,S$GLB,, | Performed by: PHYSICIAN ASSISTANT

## 2021-08-18 PROCEDURE — 1126F AMNT PAIN NOTED NONE PRSNT: CPT | Mod: CPTII,S$GLB,, | Performed by: PHYSICIAN ASSISTANT

## 2021-08-18 PROCEDURE — 1159F PR MEDICATION LIST DOCUMENTED IN MEDICAL RECORD: ICD-10-PCS | Mod: CPTII,S$GLB,, | Performed by: PHYSICIAN ASSISTANT

## 2021-08-18 PROCEDURE — 3078F PR MOST RECENT DIASTOLIC BLOOD PRESSURE < 80 MM HG: ICD-10-PCS | Mod: CPTII,S$GLB,, | Performed by: PHYSICIAN ASSISTANT

## 2021-08-18 PROCEDURE — 3008F PR BODY MASS INDEX (BMI) DOCUMENTED: ICD-10-PCS | Mod: CPTII,S$GLB,, | Performed by: PHYSICIAN ASSISTANT

## 2021-08-18 PROCEDURE — 99499 UNLISTED E&M SERVICE: CPT | Mod: HCNC,S$GLB,, | Performed by: PHYSICIAN ASSISTANT

## 2021-08-18 PROCEDURE — 1101F PR PT FALLS ASSESS DOC 0-1 FALLS W/OUT INJ PAST YR: ICD-10-PCS | Mod: CPTII,S$GLB,, | Performed by: PHYSICIAN ASSISTANT

## 2021-08-18 PROCEDURE — 99499 RISK ADDL DX/OHS AUDIT: ICD-10-PCS | Mod: HCNC,S$GLB,, | Performed by: PHYSICIAN ASSISTANT

## 2021-08-18 PROCEDURE — 99213 OFFICE O/P EST LOW 20 MIN: CPT | Mod: S$GLB,,, | Performed by: PHYSICIAN ASSISTANT

## 2021-08-18 PROCEDURE — 1160F PR REVIEW ALL MEDS BY PRESCRIBER/CLIN PHARMACIST DOCUMENTED: ICD-10-PCS | Mod: CPTII,S$GLB,, | Performed by: PHYSICIAN ASSISTANT

## 2021-08-18 PROCEDURE — 99999 PR PBB SHADOW E&M-EST. PATIENT-LVL IV: ICD-10-PCS | Mod: PBBFAC,,, | Performed by: PHYSICIAN ASSISTANT

## 2021-08-18 PROCEDURE — 1160F RVW MEDS BY RX/DR IN RCRD: CPT | Mod: CPTII,S$GLB,, | Performed by: PHYSICIAN ASSISTANT

## 2021-08-18 PROCEDURE — 3288F FALL RISK ASSESSMENT DOCD: CPT | Mod: CPTII,S$GLB,, | Performed by: PHYSICIAN ASSISTANT

## 2021-08-18 PROCEDURE — 99999 PR PBB SHADOW E&M-EST. PATIENT-LVL IV: CPT | Mod: PBBFAC,,, | Performed by: PHYSICIAN ASSISTANT

## 2021-08-18 PROCEDURE — 3044F HG A1C LEVEL LT 7.0%: CPT | Mod: CPTII,S$GLB,, | Performed by: PHYSICIAN ASSISTANT

## 2021-08-18 PROCEDURE — 1126F PR PAIN SEVERITY QUANTIFIED, NO PAIN PRESENT: ICD-10-PCS | Mod: CPTII,S$GLB,, | Performed by: PHYSICIAN ASSISTANT

## 2021-08-18 PROCEDURE — 3074F PR MOST RECENT SYSTOLIC BLOOD PRESSURE < 130 MM HG: ICD-10-PCS | Mod: CPTII,S$GLB,, | Performed by: PHYSICIAN ASSISTANT

## 2021-08-18 PROCEDURE — 3288F PR FALLS RISK ASSESSMENT DOCUMENTED: ICD-10-PCS | Mod: CPTII,S$GLB,, | Performed by: PHYSICIAN ASSISTANT

## 2021-08-18 PROCEDURE — 1159F MED LIST DOCD IN RCRD: CPT | Mod: CPTII,S$GLB,, | Performed by: PHYSICIAN ASSISTANT

## 2021-10-26 ENCOUNTER — TELEPHONE (OUTPATIENT)
Dept: ENDOCRINOLOGY | Facility: CLINIC | Age: 66
End: 2021-10-26
Payer: COMMERCIAL

## 2021-10-26 ENCOUNTER — TELEPHONE (OUTPATIENT)
Dept: FAMILY MEDICINE | Facility: CLINIC | Age: 66
End: 2021-10-26
Payer: COMMERCIAL

## 2021-10-28 RX ORDER — DICLOFENAC SODIUM 10 MG/G
2 GEL TOPICAL DAILY
Status: DISCONTINUED | OUTPATIENT
Start: 2021-10-28 | End: 2021-11-04

## 2021-11-04 ENCOUNTER — TELEPHONE (OUTPATIENT)
Dept: FAMILY MEDICINE | Facility: CLINIC | Age: 66
End: 2021-11-04
Payer: COMMERCIAL

## 2021-11-04 DIAGNOSIS — M15.9 GENERALIZED OA: Primary | ICD-10-CM

## 2021-11-04 RX ORDER — DICLOFENAC SODIUM 10 MG/G
2 GEL TOPICAL DAILY
Status: CANCELLED | OUTPATIENT
Start: 2021-11-04

## 2021-11-04 RX ORDER — DICLOFENAC SODIUM 10 MG/G
2 GEL TOPICAL 2 TIMES DAILY
Qty: 100 G | Refills: 3 | Status: SHIPPED | OUTPATIENT
Start: 2021-11-04 | End: 2021-11-05 | Stop reason: SDUPTHER

## 2021-11-05 ENCOUNTER — OFFICE VISIT (OUTPATIENT)
Dept: FAMILY MEDICINE | Facility: CLINIC | Age: 66
End: 2021-11-05
Payer: COMMERCIAL

## 2021-11-05 VITALS
BODY MASS INDEX: 27.77 KG/M2 | HEIGHT: 70 IN | SYSTOLIC BLOOD PRESSURE: 118 MMHG | TEMPERATURE: 98 F | WEIGHT: 194 LBS | HEART RATE: 79 BPM | OXYGEN SATURATION: 97 % | DIASTOLIC BLOOD PRESSURE: 68 MMHG

## 2021-11-05 DIAGNOSIS — M25.562 ACUTE PAIN OF LEFT KNEE: ICD-10-CM

## 2021-11-05 DIAGNOSIS — T14.8XXA SPRAIN: ICD-10-CM

## 2021-11-05 DIAGNOSIS — M15.9 GENERALIZED OA: ICD-10-CM

## 2021-11-05 PROCEDURE — 1101F PT FALLS ASSESS-DOCD LE1/YR: CPT | Mod: HCNC,CPTII,S$GLB, | Performed by: NURSE PRACTITIONER

## 2021-11-05 PROCEDURE — 3288F FALL RISK ASSESSMENT DOCD: CPT | Mod: HCNC,CPTII,S$GLB, | Performed by: NURSE PRACTITIONER

## 2021-11-05 PROCEDURE — 99214 OFFICE O/P EST MOD 30 MIN: CPT | Mod: HCNC,S$GLB,, | Performed by: NURSE PRACTITIONER

## 2021-11-05 PROCEDURE — 99499 UNLISTED E&M SERVICE: CPT | Mod: HCNC,S$GLB,, | Performed by: NURSE PRACTITIONER

## 2021-11-05 PROCEDURE — 3288F PR FALLS RISK ASSESSMENT DOCUMENTED: ICD-10-PCS | Mod: HCNC,CPTII,S$GLB, | Performed by: NURSE PRACTITIONER

## 2021-11-05 PROCEDURE — 3008F PR BODY MASS INDEX (BMI) DOCUMENTED: ICD-10-PCS | Mod: HCNC,CPTII,S$GLB, | Performed by: NURSE PRACTITIONER

## 2021-11-05 PROCEDURE — 1126F AMNT PAIN NOTED NONE PRSNT: CPT | Mod: HCNC,CPTII,S$GLB, | Performed by: NURSE PRACTITIONER

## 2021-11-05 PROCEDURE — 3074F PR MOST RECENT SYSTOLIC BLOOD PRESSURE < 130 MM HG: ICD-10-PCS | Mod: HCNC,CPTII,S$GLB, | Performed by: NURSE PRACTITIONER

## 2021-11-05 PROCEDURE — 3044F HG A1C LEVEL LT 7.0%: CPT | Mod: HCNC,CPTII,S$GLB, | Performed by: NURSE PRACTITIONER

## 2021-11-05 PROCEDURE — 3078F DIAST BP <80 MM HG: CPT | Mod: HCNC,CPTII,S$GLB, | Performed by: NURSE PRACTITIONER

## 2021-11-05 PROCEDURE — 1101F PR PT FALLS ASSESS DOC 0-1 FALLS W/OUT INJ PAST YR: ICD-10-PCS | Mod: HCNC,CPTII,S$GLB, | Performed by: NURSE PRACTITIONER

## 2021-11-05 PROCEDURE — 1126F PR PAIN SEVERITY QUANTIFIED, NO PAIN PRESENT: ICD-10-PCS | Mod: HCNC,CPTII,S$GLB, | Performed by: NURSE PRACTITIONER

## 2021-11-05 PROCEDURE — 1159F PR MEDICATION LIST DOCUMENTED IN MEDICAL RECORD: ICD-10-PCS | Mod: HCNC,CPTII,S$GLB, | Performed by: NURSE PRACTITIONER

## 2021-11-05 PROCEDURE — 3008F BODY MASS INDEX DOCD: CPT | Mod: HCNC,CPTII,S$GLB, | Performed by: NURSE PRACTITIONER

## 2021-11-05 PROCEDURE — 99214 PR OFFICE/OUTPT VISIT, EST, LEVL IV, 30-39 MIN: ICD-10-PCS | Mod: HCNC,S$GLB,, | Performed by: NURSE PRACTITIONER

## 2021-11-05 PROCEDURE — 3078F PR MOST RECENT DIASTOLIC BLOOD PRESSURE < 80 MM HG: ICD-10-PCS | Mod: HCNC,CPTII,S$GLB, | Performed by: NURSE PRACTITIONER

## 2021-11-05 PROCEDURE — 3074F SYST BP LT 130 MM HG: CPT | Mod: HCNC,CPTII,S$GLB, | Performed by: NURSE PRACTITIONER

## 2021-11-05 PROCEDURE — 99999 PR PBB SHADOW E&M-EST. PATIENT-LVL III: ICD-10-PCS | Mod: PBBFAC,HCNC,, | Performed by: NURSE PRACTITIONER

## 2021-11-05 PROCEDURE — 1159F MED LIST DOCD IN RCRD: CPT | Mod: HCNC,CPTII,S$GLB, | Performed by: NURSE PRACTITIONER

## 2021-11-05 PROCEDURE — 99499 RISK ADDL DX/OHS AUDIT: ICD-10-PCS | Mod: HCNC,S$GLB,, | Performed by: NURSE PRACTITIONER

## 2021-11-05 PROCEDURE — 99999 PR PBB SHADOW E&M-EST. PATIENT-LVL III: CPT | Mod: PBBFAC,HCNC,, | Performed by: NURSE PRACTITIONER

## 2021-11-05 PROCEDURE — 3044F PR MOST RECENT HEMOGLOBIN A1C LEVEL <7.0%: ICD-10-PCS | Mod: HCNC,CPTII,S$GLB, | Performed by: NURSE PRACTITIONER

## 2021-11-05 RX ORDER — INSULIN DETEMIR 100 [IU]/ML
INJECTION, SOLUTION SUBCUTANEOUS
Qty: 30 ML | Refills: 3 | Status: SHIPPED | OUTPATIENT
Start: 2021-11-05 | End: 2022-01-31 | Stop reason: SDUPTHER

## 2021-11-05 RX ORDER — METFORMIN HYDROCHLORIDE 1000 MG/1
1000 TABLET ORAL DAILY
Qty: 180 TABLET | Refills: 3 | Status: SHIPPED | OUTPATIENT
Start: 2021-11-05 | End: 2022-04-21

## 2021-11-05 RX ORDER — LANCETS 33 GAUGE
EACH MISCELLANEOUS
Qty: 200 EACH | Refills: 3 | Status: SHIPPED | OUTPATIENT
Start: 2021-11-05 | End: 2022-09-07

## 2021-11-05 RX ORDER — ORAL SEMAGLUTIDE 7 MG/1
7 TABLET ORAL DAILY
Qty: 90 TABLET | Refills: 1 | Status: SHIPPED | OUTPATIENT
Start: 2021-11-05 | End: 2022-03-24

## 2021-11-05 RX ORDER — DICLOFENAC SODIUM 10 MG/G
2 GEL TOPICAL 2 TIMES DAILY
Qty: 100 G | Refills: 3 | Status: SHIPPED | OUTPATIENT
Start: 2021-11-05 | End: 2022-11-30

## 2021-11-08 ENCOUNTER — TELEPHONE (OUTPATIENT)
Dept: FAMILY MEDICINE | Facility: CLINIC | Age: 66
End: 2021-11-08
Payer: COMMERCIAL

## 2021-11-08 DIAGNOSIS — J30.9 ALLERGIC SINUSITIS: Primary | ICD-10-CM

## 2021-11-08 RX ORDER — TRIAMCINOLONE ACETONIDE 55 UG/1
2 SPRAY, METERED NASAL DAILY
Qty: 16 G | Refills: 0 | Status: SHIPPED | OUTPATIENT
Start: 2021-11-08

## 2021-12-13 ENCOUNTER — LAB VISIT (OUTPATIENT)
Dept: LAB | Facility: HOSPITAL | Age: 66
End: 2021-12-13
Attending: PHYSICIAN ASSISTANT
Payer: COMMERCIAL

## 2021-12-13 ENCOUNTER — TELEPHONE (OUTPATIENT)
Dept: FAMILY MEDICINE | Facility: CLINIC | Age: 66
End: 2021-12-13
Payer: COMMERCIAL

## 2021-12-13 DIAGNOSIS — M25.562 ACUTE PAIN OF LEFT KNEE: Primary | ICD-10-CM

## 2021-12-13 LAB
ALBUMIN SERPL BCP-MCNC: 4.1 G/DL (ref 3.5–5.2)
ANION GAP SERPL CALC-SCNC: 13 MMOL/L (ref 8–16)
BUN SERPL-MCNC: 13 MG/DL (ref 8–23)
CALCIUM SERPL-MCNC: 9.7 MG/DL (ref 8.7–10.5)
CHLORIDE SERPL-SCNC: 102 MMOL/L (ref 95–110)
CO2 SERPL-SCNC: 25 MMOL/L (ref 23–29)
CREAT SERPL-MCNC: 0.9 MG/DL (ref 0.5–1.4)
EST. GFR  (AFRICAN AMERICAN): >60 ML/MIN/1.73 M^2
EST. GFR  (NON AFRICAN AMERICAN): >60 ML/MIN/1.73 M^2
ESTIMATED AVG GLUCOSE: 126 MG/DL (ref 68–131)
GLUCOSE SERPL-MCNC: 98 MG/DL (ref 70–110)
HBA1C MFR BLD: 6 % (ref 4–5.6)
PHOSPHATE SERPL-MCNC: 2.7 MG/DL (ref 2.7–4.5)
POTASSIUM SERPL-SCNC: 3.9 MMOL/L (ref 3.5–5.1)
SODIUM SERPL-SCNC: 140 MMOL/L (ref 136–145)

## 2021-12-13 PROCEDURE — 36415 COLL VENOUS BLD VENIPUNCTURE: CPT | Mod: HCNC,PO | Performed by: PHYSICIAN ASSISTANT

## 2021-12-13 PROCEDURE — 83036 HEMOGLOBIN GLYCOSYLATED A1C: CPT | Mod: HCNC | Performed by: PHYSICIAN ASSISTANT

## 2021-12-13 PROCEDURE — 80069 RENAL FUNCTION PANEL: CPT | Mod: HCNC | Performed by: PHYSICIAN ASSISTANT

## 2021-12-14 ENCOUNTER — TELEPHONE (OUTPATIENT)
Dept: ORTHOPEDICS | Facility: CLINIC | Age: 66
End: 2021-12-14
Payer: COMMERCIAL

## 2021-12-19 ENCOUNTER — PATIENT OUTREACH (OUTPATIENT)
Dept: ADMINISTRATIVE | Facility: OTHER | Age: 66
End: 2021-12-19
Payer: COMMERCIAL

## 2021-12-20 ENCOUNTER — OFFICE VISIT (OUTPATIENT)
Dept: ENDOCRINOLOGY | Facility: CLINIC | Age: 66
End: 2021-12-20
Payer: COMMERCIAL

## 2021-12-20 VITALS
SYSTOLIC BLOOD PRESSURE: 110 MMHG | OXYGEN SATURATION: 98 % | HEART RATE: 75 BPM | WEIGHT: 194.44 LBS | BODY MASS INDEX: 27.84 KG/M2 | TEMPERATURE: 98 F | DIASTOLIC BLOOD PRESSURE: 80 MMHG | HEIGHT: 70 IN

## 2021-12-20 DIAGNOSIS — E55.9 HYPOVITAMINOSIS D: Primary | ICD-10-CM

## 2021-12-20 DIAGNOSIS — E66.9 OBESITY (BMI 30-39.9): ICD-10-CM

## 2021-12-20 DIAGNOSIS — N52.9 ERECTILE DYSFUNCTION, UNSPECIFIED ERECTILE DYSFUNCTION TYPE: ICD-10-CM

## 2021-12-20 DIAGNOSIS — E78.5 HYPERLIPIDEMIA, UNSPECIFIED HYPERLIPIDEMIA TYPE: ICD-10-CM

## 2021-12-20 DIAGNOSIS — M25.562 LEFT KNEE PAIN, UNSPECIFIED CHRONICITY: Primary | ICD-10-CM

## 2021-12-20 PROCEDURE — 99214 OFFICE O/P EST MOD 30 MIN: CPT | Mod: HCNC,S$GLB,, | Performed by: PHYSICIAN ASSISTANT

## 2021-12-20 PROCEDURE — 3066F NEPHROPATHY DOC TX: CPT | Mod: HCNC,CPTII,S$GLB, | Performed by: PHYSICIAN ASSISTANT

## 2021-12-20 PROCEDURE — 99214 PR OFFICE/OUTPT VISIT, EST, LEVL IV, 30-39 MIN: ICD-10-PCS | Mod: HCNC,S$GLB,, | Performed by: PHYSICIAN ASSISTANT

## 2021-12-20 PROCEDURE — 99999 PR PBB SHADOW E&M-EST. PATIENT-LVL IV: CPT | Mod: PBBFAC,HCNC,, | Performed by: PHYSICIAN ASSISTANT

## 2021-12-20 PROCEDURE — 99999 PR PBB SHADOW E&M-EST. PATIENT-LVL IV: ICD-10-PCS | Mod: PBBFAC,HCNC,, | Performed by: PHYSICIAN ASSISTANT

## 2021-12-20 PROCEDURE — 3061F PR NEG MICROALBUMINURIA RESULT DOCUMENTED/REVIEW: ICD-10-PCS | Mod: HCNC,CPTII,S$GLB, | Performed by: PHYSICIAN ASSISTANT

## 2021-12-20 PROCEDURE — 3061F NEG MICROALBUMINURIA REV: CPT | Mod: HCNC,CPTII,S$GLB, | Performed by: PHYSICIAN ASSISTANT

## 2021-12-20 PROCEDURE — 3066F PR DOCUMENTATION OF TREATMENT FOR NEPHROPATHY: ICD-10-PCS | Mod: HCNC,CPTII,S$GLB, | Performed by: PHYSICIAN ASSISTANT

## 2021-12-20 RX ORDER — CHOLECALCIFEROL (VITAMIN D3) 125 MCG
1 TABLET ORAL DAILY
Qty: 90 TABLET | Refills: 3 | Status: SHIPPED | OUTPATIENT
Start: 2021-12-20

## 2021-12-20 RX ORDER — CHOLECALCIFEROL (VITAMIN D3) 125 MCG
1 TABLET ORAL DAILY
Qty: 90 TABLET | Refills: 3 | OUTPATIENT
Start: 2021-12-20

## 2021-12-22 ENCOUNTER — OFFICE VISIT (OUTPATIENT)
Dept: URGENT CARE | Facility: CLINIC | Age: 66
End: 2021-12-22
Payer: COMMERCIAL

## 2021-12-22 VITALS
TEMPERATURE: 97 F | SYSTOLIC BLOOD PRESSURE: 114 MMHG | WEIGHT: 194.81 LBS | OXYGEN SATURATION: 97 % | HEART RATE: 88 BPM | DIASTOLIC BLOOD PRESSURE: 67 MMHG | HEIGHT: 70 IN | RESPIRATION RATE: 18 BRPM | BODY MASS INDEX: 27.89 KG/M2

## 2021-12-22 DIAGNOSIS — Z20.822 ENCOUNTER FOR LABORATORY TESTING FOR COVID-19 VIRUS: Primary | ICD-10-CM

## 2021-12-22 PROCEDURE — U0003 INFECTIOUS AGENT DETECTION BY NUCLEIC ACID (DNA OR RNA); SEVERE ACUTE RESPIRATORY SYNDROME CORONAVIRUS 2 (SARS-COV-2) (CORONAVIRUS DISEASE [COVID-19]), AMPLIFIED PROBE TECHNIQUE, MAKING USE OF HIGH THROUGHPUT TECHNOLOGIES AS DESCRIBED BY CMS-2020-01-R: HCPCS | Mod: HCNC | Performed by: NURSE PRACTITIONER

## 2021-12-22 PROCEDURE — 3044F HG A1C LEVEL LT 7.0%: CPT | Mod: CPTII,S$GLB,, | Performed by: NURSE PRACTITIONER

## 2021-12-22 PROCEDURE — 3078F DIAST BP <80 MM HG: CPT | Mod: CPTII,S$GLB,, | Performed by: NURSE PRACTITIONER

## 2021-12-22 PROCEDURE — 99202 PR OFFICE/OUTPT VISIT, NEW, LEVL II, 15-29 MIN: ICD-10-PCS | Mod: S$GLB,,, | Performed by: NURSE PRACTITIONER

## 2021-12-22 PROCEDURE — 99202 OFFICE O/P NEW SF 15 MIN: CPT | Mod: S$GLB,,, | Performed by: NURSE PRACTITIONER

## 2021-12-22 PROCEDURE — 3008F PR BODY MASS INDEX (BMI) DOCUMENTED: ICD-10-PCS | Mod: CPTII,S$GLB,, | Performed by: NURSE PRACTITIONER

## 2021-12-22 PROCEDURE — 1160F RVW MEDS BY RX/DR IN RCRD: CPT | Mod: CPTII,S$GLB,, | Performed by: NURSE PRACTITIONER

## 2021-12-22 PROCEDURE — 3061F PR NEG MICROALBUMINURIA RESULT DOCUMENTED/REVIEW: ICD-10-PCS | Mod: CPTII,S$GLB,, | Performed by: NURSE PRACTITIONER

## 2021-12-22 PROCEDURE — 3066F PR DOCUMENTATION OF TREATMENT FOR NEPHROPATHY: ICD-10-PCS | Mod: CPTII,S$GLB,, | Performed by: NURSE PRACTITIONER

## 2021-12-22 PROCEDURE — 3074F PR MOST RECENT SYSTOLIC BLOOD PRESSURE < 130 MM HG: ICD-10-PCS | Mod: CPTII,S$GLB,, | Performed by: NURSE PRACTITIONER

## 2021-12-22 PROCEDURE — 3008F BODY MASS INDEX DOCD: CPT | Mod: CPTII,S$GLB,, | Performed by: NURSE PRACTITIONER

## 2021-12-22 PROCEDURE — 3061F NEG MICROALBUMINURIA REV: CPT | Mod: CPTII,S$GLB,, | Performed by: NURSE PRACTITIONER

## 2021-12-22 PROCEDURE — 3074F SYST BP LT 130 MM HG: CPT | Mod: CPTII,S$GLB,, | Performed by: NURSE PRACTITIONER

## 2021-12-22 PROCEDURE — 1159F MED LIST DOCD IN RCRD: CPT | Mod: CPTII,S$GLB,, | Performed by: NURSE PRACTITIONER

## 2021-12-22 PROCEDURE — U0005 INFEC AGEN DETEC AMPLI PROBE: HCPCS | Performed by: NURSE PRACTITIONER

## 2021-12-22 PROCEDURE — 3044F PR MOST RECENT HEMOGLOBIN A1C LEVEL <7.0%: ICD-10-PCS | Mod: CPTII,S$GLB,, | Performed by: NURSE PRACTITIONER

## 2021-12-22 PROCEDURE — 1159F PR MEDICATION LIST DOCUMENTED IN MEDICAL RECORD: ICD-10-PCS | Mod: CPTII,S$GLB,, | Performed by: NURSE PRACTITIONER

## 2021-12-22 PROCEDURE — 3066F NEPHROPATHY DOC TX: CPT | Mod: CPTII,S$GLB,, | Performed by: NURSE PRACTITIONER

## 2021-12-22 PROCEDURE — 1160F PR REVIEW ALL MEDS BY PRESCRIBER/CLIN PHARMACIST DOCUMENTED: ICD-10-PCS | Mod: CPTII,S$GLB,, | Performed by: NURSE PRACTITIONER

## 2021-12-22 PROCEDURE — 3078F PR MOST RECENT DIASTOLIC BLOOD PRESSURE < 80 MM HG: ICD-10-PCS | Mod: CPTII,S$GLB,, | Performed by: NURSE PRACTITIONER

## 2021-12-24 LAB
SARS-COV-2 RNA RESP QL NAA+PROBE: NOT DETECTED
SARS-COV-2- CYCLE NUMBER: NORMAL

## 2021-12-30 ENCOUNTER — OFFICE VISIT (OUTPATIENT)
Dept: ORTHOPEDICS | Facility: CLINIC | Age: 66
End: 2021-12-30
Payer: COMMERCIAL

## 2021-12-30 ENCOUNTER — HOSPITAL ENCOUNTER (OUTPATIENT)
Dept: RADIOLOGY | Facility: HOSPITAL | Age: 66
Discharge: HOME | End: 2021-12-30
Attending: ORTHOPAEDIC SURGERY
Payer: COMMERCIAL

## 2021-12-30 VITALS — WEIGHT: 194 LBS | RESPIRATION RATE: 16 BRPM | HEIGHT: 69 IN | BODY MASS INDEX: 28.73 KG/M2

## 2021-12-30 DIAGNOSIS — M17.12 UNILATERAL PRIMARY OSTEOARTHRITIS, LEFT KNEE: ICD-10-CM

## 2021-12-30 DIAGNOSIS — M25.562 LEFT KNEE PAIN, UNSPECIFIED CHRONICITY: ICD-10-CM

## 2021-12-30 PROCEDURE — 99999 PR PBB SHADOW E&M-EST. PATIENT-LVL IV: ICD-10-PCS | Mod: PBBFAC,HCNC,, | Performed by: ORTHOPAEDIC SURGERY

## 2021-12-30 PROCEDURE — 3044F PR MOST RECENT HEMOGLOBIN A1C LEVEL <7.0%: ICD-10-PCS | Mod: HCNC,CPTII,S$GLB, | Performed by: ORTHOPAEDIC SURGERY

## 2021-12-30 PROCEDURE — 3288F PR FALLS RISK ASSESSMENT DOCUMENTED: ICD-10-PCS | Mod: HCNC,CPTII,S$GLB, | Performed by: ORTHOPAEDIC SURGERY

## 2021-12-30 PROCEDURE — 73564 X-RAY EXAM KNEE 4 OR MORE: CPT | Mod: TC,HCNC,PN,LT

## 2021-12-30 PROCEDURE — 3066F NEPHROPATHY DOC TX: CPT | Mod: HCNC,CPTII,S$GLB, | Performed by: ORTHOPAEDIC SURGERY

## 2021-12-30 PROCEDURE — 3044F HG A1C LEVEL LT 7.0%: CPT | Mod: HCNC,CPTII,S$GLB, | Performed by: ORTHOPAEDIC SURGERY

## 2021-12-30 PROCEDURE — 73564 X-RAY EXAM KNEE 4 OR MORE: CPT | Mod: 26,HCNC,LT, | Performed by: RADIOLOGY

## 2021-12-30 PROCEDURE — 1125F PR PAIN SEVERITY QUANTIFIED, PAIN PRESENT: ICD-10-PCS | Mod: HCNC,CPTII,S$GLB, | Performed by: ORTHOPAEDIC SURGERY

## 2021-12-30 PROCEDURE — 73562 X-RAY EXAM OF KNEE 3: CPT | Mod: 26,HCNC,RT, | Performed by: RADIOLOGY

## 2021-12-30 PROCEDURE — 1159F PR MEDICATION LIST DOCUMENTED IN MEDICAL RECORD: ICD-10-PCS | Mod: HCNC,CPTII,S$GLB, | Performed by: ORTHOPAEDIC SURGERY

## 2021-12-30 PROCEDURE — 99204 OFFICE O/P NEW MOD 45 MIN: CPT | Mod: HCNC,S$GLB,, | Performed by: ORTHOPAEDIC SURGERY

## 2021-12-30 PROCEDURE — 3061F PR NEG MICROALBUMINURIA RESULT DOCUMENTED/REVIEW: ICD-10-PCS | Mod: HCNC,CPTII,S$GLB, | Performed by: ORTHOPAEDIC SURGERY

## 2021-12-30 PROCEDURE — 3288F FALL RISK ASSESSMENT DOCD: CPT | Mod: HCNC,CPTII,S$GLB, | Performed by: ORTHOPAEDIC SURGERY

## 2021-12-30 PROCEDURE — 1101F PR PT FALLS ASSESS DOC 0-1 FALLS W/OUT INJ PAST YR: ICD-10-PCS | Mod: HCNC,CPTII,S$GLB, | Performed by: ORTHOPAEDIC SURGERY

## 2021-12-30 PROCEDURE — 3008F PR BODY MASS INDEX (BMI) DOCUMENTED: ICD-10-PCS | Mod: HCNC,CPTII,S$GLB, | Performed by: ORTHOPAEDIC SURGERY

## 2021-12-30 PROCEDURE — 1101F PT FALLS ASSESS-DOCD LE1/YR: CPT | Mod: HCNC,CPTII,S$GLB, | Performed by: ORTHOPAEDIC SURGERY

## 2021-12-30 PROCEDURE — 99204 PR OFFICE/OUTPT VISIT, NEW, LEVL IV, 45-59 MIN: ICD-10-PCS | Mod: HCNC,S$GLB,, | Performed by: ORTHOPAEDIC SURGERY

## 2021-12-30 PROCEDURE — 1159F MED LIST DOCD IN RCRD: CPT | Mod: HCNC,CPTII,S$GLB, | Performed by: ORTHOPAEDIC SURGERY

## 2021-12-30 PROCEDURE — 99999 PR PBB SHADOW E&M-EST. PATIENT-LVL IV: CPT | Mod: PBBFAC,HCNC,, | Performed by: ORTHOPAEDIC SURGERY

## 2021-12-30 PROCEDURE — 1125F AMNT PAIN NOTED PAIN PRSNT: CPT | Mod: HCNC,CPTII,S$GLB, | Performed by: ORTHOPAEDIC SURGERY

## 2021-12-30 PROCEDURE — 3066F PR DOCUMENTATION OF TREATMENT FOR NEPHROPATHY: ICD-10-PCS | Mod: HCNC,CPTII,S$GLB, | Performed by: ORTHOPAEDIC SURGERY

## 2021-12-30 PROCEDURE — 3008F BODY MASS INDEX DOCD: CPT | Mod: HCNC,CPTII,S$GLB, | Performed by: ORTHOPAEDIC SURGERY

## 2021-12-30 PROCEDURE — 73562 XR KNEE ORTHO LEFT WITH FLEXION: ICD-10-PCS | Mod: 26,HCNC,RT, | Performed by: RADIOLOGY

## 2021-12-30 PROCEDURE — 1160F PR REVIEW ALL MEDS BY PRESCRIBER/CLIN PHARMACIST DOCUMENTED: ICD-10-PCS | Mod: HCNC,CPTII,S$GLB, | Performed by: ORTHOPAEDIC SURGERY

## 2021-12-30 PROCEDURE — 73564 XR KNEE ORTHO LEFT WITH FLEXION: ICD-10-PCS | Mod: 26,HCNC,LT, | Performed by: RADIOLOGY

## 2021-12-30 PROCEDURE — 1160F RVW MEDS BY RX/DR IN RCRD: CPT | Mod: HCNC,CPTII,S$GLB, | Performed by: ORTHOPAEDIC SURGERY

## 2021-12-30 PROCEDURE — 3061F NEG MICROALBUMINURIA REV: CPT | Mod: HCNC,CPTII,S$GLB, | Performed by: ORTHOPAEDIC SURGERY

## 2021-12-30 RX ORDER — MELOXICAM 7.5 MG/1
7.5 TABLET ORAL DAILY
Qty: 30 TABLET | Refills: 11 | Status: SHIPPED | OUTPATIENT
Start: 2021-12-30 | End: 2022-01-29

## 2022-01-13 ENCOUNTER — OFFICE VISIT (OUTPATIENT)
Dept: ORTHOPEDICS | Facility: CLINIC | Age: 67
End: 2022-01-13
Payer: COMMERCIAL

## 2022-01-13 VITALS — HEIGHT: 69 IN | WEIGHT: 194 LBS | RESPIRATION RATE: 16 BRPM | BODY MASS INDEX: 28.73 KG/M2

## 2022-01-13 DIAGNOSIS — M25.562 LEFT KNEE PAIN, UNSPECIFIED CHRONICITY: Primary | ICD-10-CM

## 2022-01-13 PROCEDURE — 99999 PR PBB SHADOW E&M-EST. PATIENT-LVL IV: CPT | Mod: PBBFAC,HCNC,, | Performed by: ORTHOPAEDIC SURGERY

## 2022-01-13 PROCEDURE — 3008F BODY MASS INDEX DOCD: CPT | Mod: HCNC,CPTII,S$GLB, | Performed by: ORTHOPAEDIC SURGERY

## 2022-01-13 PROCEDURE — 1160F RVW MEDS BY RX/DR IN RCRD: CPT | Mod: HCNC,CPTII,S$GLB, | Performed by: ORTHOPAEDIC SURGERY

## 2022-01-13 PROCEDURE — 99212 OFFICE O/P EST SF 10 MIN: CPT | Mod: HCNC,S$GLB,, | Performed by: ORTHOPAEDIC SURGERY

## 2022-01-13 PROCEDURE — 1101F PR PT FALLS ASSESS DOC 0-1 FALLS W/OUT INJ PAST YR: ICD-10-PCS | Mod: HCNC,CPTII,S$GLB, | Performed by: ORTHOPAEDIC SURGERY

## 2022-01-13 PROCEDURE — 1125F AMNT PAIN NOTED PAIN PRSNT: CPT | Mod: HCNC,CPTII,S$GLB, | Performed by: ORTHOPAEDIC SURGERY

## 2022-01-13 PROCEDURE — 3288F PR FALLS RISK ASSESSMENT DOCUMENTED: ICD-10-PCS | Mod: HCNC,CPTII,S$GLB, | Performed by: ORTHOPAEDIC SURGERY

## 2022-01-13 PROCEDURE — 1125F PR PAIN SEVERITY QUANTIFIED, PAIN PRESENT: ICD-10-PCS | Mod: HCNC,CPTII,S$GLB, | Performed by: ORTHOPAEDIC SURGERY

## 2022-01-13 PROCEDURE — 99999 PR PBB SHADOW E&M-EST. PATIENT-LVL IV: ICD-10-PCS | Mod: PBBFAC,HCNC,, | Performed by: ORTHOPAEDIC SURGERY

## 2022-01-13 PROCEDURE — 1159F MED LIST DOCD IN RCRD: CPT | Mod: HCNC,CPTII,S$GLB, | Performed by: ORTHOPAEDIC SURGERY

## 2022-01-13 PROCEDURE — 3288F FALL RISK ASSESSMENT DOCD: CPT | Mod: HCNC,CPTII,S$GLB, | Performed by: ORTHOPAEDIC SURGERY

## 2022-01-13 PROCEDURE — 3072F LOW RISK FOR RETINOPATHY: CPT | Mod: HCNC,CPTII,S$GLB, | Performed by: ORTHOPAEDIC SURGERY

## 2022-01-13 PROCEDURE — 1159F PR MEDICATION LIST DOCUMENTED IN MEDICAL RECORD: ICD-10-PCS | Mod: HCNC,CPTII,S$GLB, | Performed by: ORTHOPAEDIC SURGERY

## 2022-01-13 PROCEDURE — 99212 PR OFFICE/OUTPT VISIT, EST, LEVL II, 10-19 MIN: ICD-10-PCS | Mod: HCNC,S$GLB,, | Performed by: ORTHOPAEDIC SURGERY

## 2022-01-13 PROCEDURE — 3008F PR BODY MASS INDEX (BMI) DOCUMENTED: ICD-10-PCS | Mod: HCNC,CPTII,S$GLB, | Performed by: ORTHOPAEDIC SURGERY

## 2022-01-13 PROCEDURE — 1101F PT FALLS ASSESS-DOCD LE1/YR: CPT | Mod: HCNC,CPTII,S$GLB, | Performed by: ORTHOPAEDIC SURGERY

## 2022-01-13 PROCEDURE — 3072F PR LOW RISK FOR RETINOPATHY: ICD-10-PCS | Mod: HCNC,CPTII,S$GLB, | Performed by: ORTHOPAEDIC SURGERY

## 2022-01-13 PROCEDURE — 1160F PR REVIEW ALL MEDS BY PRESCRIBER/CLIN PHARMACIST DOCUMENTED: ICD-10-PCS | Mod: HCNC,CPTII,S$GLB, | Performed by: ORTHOPAEDIC SURGERY

## 2022-01-13 NOTE — PROGRESS NOTES
CC:  66-year-old male follows up with osteoarthritis of his left knee.  On his last visit we had started him on once a day Mobic and given him a knee brace.  He states he is getting some improvement with the medication but still has pain.  He states the brace is difficult to wear it today does not have it on.    Examination of the Left Lower Extremity:      Skin intact throughout.  Motor function is intact distally EHL/FHL/TA/rico   +2 dorsalis pedis and posterior tibial pulses   Sensation to light touch intact distally dorsal, plantar, and first web space      Examination of the Left knee:     ROM 0 - 150   Effusion positive  Tenderness to palpation at the joint line positive  Pain during range of motion positive  Crepitation during range of motion positive     positive increased pain noted with flexion past 90   positive antalgic gait noted   negative Lachman's Test   negative Anterior Drawer Test   negative Posterior Drawer Test   negative McMurrays Test   negative Disco Test   negative Varus/Valgus instability    Dx:  Osteoarthritis of the left knee    Plan:  Offered the patient an injection but he declined.  We discussed physical therapy and he would like to try that.  We will make the PT referral have him follow up in 4-6 weeks.

## 2022-01-20 ENCOUNTER — CLINICAL SUPPORT (OUTPATIENT)
Dept: REHABILITATION | Facility: HOSPITAL | Age: 67
End: 2022-01-20
Attending: ORTHOPAEDIC SURGERY
Payer: COMMERCIAL

## 2022-01-20 DIAGNOSIS — R29.898 WEAKNESS OF LEFT LOWER EXTREMITY: ICD-10-CM

## 2022-01-20 DIAGNOSIS — R29.898 IMPAIRED FLEXIBILITY OF LOWER EXTREMITY: ICD-10-CM

## 2022-01-20 DIAGNOSIS — M25.562 LEFT KNEE PAIN, UNSPECIFIED CHRONICITY: ICD-10-CM

## 2022-01-20 DIAGNOSIS — M25.662 DECREASED RANGE OF MOTION OF LEFT KNEE: ICD-10-CM

## 2022-01-20 PROCEDURE — 97161 PT EVAL LOW COMPLEX 20 MIN: CPT | Mod: HCNC,PN | Performed by: PHYSICAL THERAPIST

## 2022-01-20 PROCEDURE — 97110 THERAPEUTIC EXERCISES: CPT | Mod: HCNC,PN | Performed by: PHYSICAL THERAPIST

## 2022-01-20 NOTE — PLAN OF CARE
"[facility]   Physical Therapy Initial Evaluation     Date: 1/20/2022   Name: Mark Patino  [facility] Number: [id number]    Therapy Diagnosis:   Encounter Diagnoses   Name Primary?    Left knee pain, unspecified chronicity     Decreased range of motion of left knee     Impaired flexibility of lower extremity     Weakness of left lower extremity      Physician: Nilton Amor II, MD    Physician Orders: PT Eval and Treat   Medical Diagnosis from Referral: Left knee pain, unspecified chronicity  Evaluation Date: 1/20/2022  Authorization Period Expiration: 12/31/2022  Plan of Care Expiration: 4/11/2022  Progress Note Due: 2/19/2022  Visit # / Visits authorized: 1/ 20  (POC 1/12)  FOTO: 1/ 3     Precautions: Standard, Diabetes and HTN  Insurance: Payor: [employer] / Plan: HU[employer]  PPO / Product Type: PPO /     Time In: 1145  Time Out: 1230  Total Appointment Time (timed & untimed codes): 45 minutes      SUBJECTIVE   Date of onset: 7/2021    History of current condition - DJ reports: a six month h/o left knee pain. He states the pain is along the medial aspect of the left knee. He notes a h/o of his knee locking and feeling stiff. He reports pain with prolonged standing and walking. He reports difficulty with ascending and descending stairs. He denies any recent falls or accidents. 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 He also states he plays basketball recreationally. The patient presents to PT ambulating with a soft knee brace on the left. It was noted that the brace was donned backwards, preventing knee flexion.    Falls: 0    Imaging, X-rays: "Tricompartmental degenerative change bilaterally.  Moderate narrowing medial compartments of the knees bilaterally slightly more so on the left.  No acute fracture or dislocation.  The degenerative change in joint space narrowing are greater today than on the prior exam"    Prior " Therapy: none  Social History:  lives with their spouse  Occupation: retired  Prior Level of Function: Independent  Current Level of Function: Decreased ability to perform ADL and limited tolerance to standing and walking.      Pain:  Current 8/10, worst 9/10, best 8/10   Location: left knee    Description: Sharp  Aggravating Factors: Standing, Bending and Walking  Easing Factors: rest    Patients goals: Decrease pain and return to playing basketball     Medical History:   Past Medical History:   Diagnosis Date    Diabetes mellitus     Hypertension     Partial Achilles tendon tear 6/11/2012       Surgical History:   Mark Patino  has no past surgical history on file.    Medications:   Mark has a current medication list which includes the following prescription(s): alcohol swabs, aspirin, atorvastatin, blood sugar diagnostic, blood-glucose meter, diclofenac sodium, ergocalciferol (vitamin d2), lancets, levemir flextouch u-100 insuln, meloxicam, metformin, pen needle, diabetic, rybelsus, and triamcinolone.    Allergies:   Review of patient's allergies indicates:   Allergen Reactions    Penicillins Rash     Happen as a child          OBJECTIVE     Posture: Decreased lumbar lordosis and forward head in standing  Palpation: Moderate point tenderness noted with palpation of the medial joint line  Sensation: Intact    Range of Motion/Strength:     Knee ROM Left Right Pain/Dysfunction with Movement   Knee flexion 94 degrees 114 degress    Knee extension -18 degrees -4 degrees      Knee MMT Left Right   Knee flexion 4/5 5/5   Knee extension 4/5 5/5       Flexibility Left Right   Hamstrings 40 degrees 44 degrees   Achilles -2 degrees 0 degrees       Special Tests:  Left Right   Anterior drawer negative. negative.   Lachman negative. negative.   Valgus stress  negative. negative.   Varus stress negative. negative.   Jenifer negative. negative.   Lateral tracking positive. J tracking negative.     Gait  Without AD   Analysis The patient presented to PT ambulating FWB on the left LE wearing a soft knee brace. He displayed decreased knee flexion in swing phase. Note: The brace was donned backwards preventing knee flexion           Limitation/Restriction for FOTO  Survey    Therapist reviewed FOTO scores for Mark Patino on 1/20/2022.   FOTO documents entered into [employer] - see Media section.    Limitation Score: 54%         TREATMENT     Total Treatment time (time-based codes) separate from Evaluation: 30 minutes      ERNESTO received the treatments listed below:      THERAPEUTIC EXERCISES to develop ROM and flexibility for 30 minutes including :     Seated Hamstring stretch 3 x 30 sec  Seated Gastroc-soleus stretch 3 x 30 sec  Seated heel slides 3 x 10  Supine heel slides 3 x 10    Distracted knee flexion with strap x 20    PATIENT EDUCATION AND HOME EXERCISES     Education provided:   - Proper method to don soft knee brace    Written Home Exercises Provided: yes. Exercises were reviewed and ERNESTO was able to demonstrate them prior to the end of the session.  DJ demonstrated fair  understanding of the education provided. See EMR under Patient Instructions for exercises provided during therapy sessions.    ASSESSMENT     Mark is a 66 y.o. male referred to outpatient Physical Therapy with a medical diagnosis of Left knee pain, unspecified chronicity. Patient presents with     1. Left knee pain  2. Decreased left knee ROM  3. Decreased left knee strength  4. Decreased LE flexibility  5. Decreased ability to perform ADL and limited tolerance to standing and walking.    Patient prognosis is Fair.   Patientt will benefit from skilled outpatient Physical Therapy to address the deficits stated above and in the chart below, provide patient /family education, and to maximize patientt's level of independence.     Plan of care discussed with patient: Yes  Patient's spiritual, cultural and educational needs considered and patient is  agreeable to the plan of care and goals as stated below:     Anticipated Barriers for therapy: none    Medical Necessity is demonstrated by the following  History  Co-morbidities and personal factors that may impact the plan of care Co-morbidities:   diabetes and HTN    Personal Factors:   no deficits     moderate   Examination  Body Structures and Functions, activity limitations and participation restrictions that may impact the plan of care Body Regions:   lower extremities    Body Systems:    ROM  strength  gait    Participation Restrictions:   none    Activity limitations:   Learning and applying knowledge  no deficits    General Tasks and Commands  no deficits    Communication  no deficits    Mobility  lifting and carrying objects  walking    Self care  no deficits    Domestic Life  no deficits    Interactions/Relationships  no deficits    Life Areas  no deficits    Community and Social Life  no deficits         moderate   Clinical Presentation stable and uncomplicated low   Decision Making/ Complexity Score: low     Goals:    Short Term Goals (STG) # weeks Goal Review Date Reviewed Date Met   1. The patient will begin a written HEP 1 Initial 1/20/2022    2. Increase knee ROM to -5* - 110* 3 Initial 1/20/2022    3. Decrease soft tissue tenderness to mild 3 Initial 1/20/2022      Long Term Goals (LTG) # weeks Goal Review Date Reviewed Date Met   1. The patient will be independent with a HEP for maintenance 6 Initial 1/20/2022    2. Increase knee ROM to 0* to 120* 6 Initial 1/20/2022    3. Increase knee strength to 5/5 6 Initial 1/20/2022    4. The patient will ambulate on a community level without AD . 6 Initial 1/20/2022    5. Decrease FOTO to < 32%.  6 Initial 1/20/2022    6. The patient will be able to ascend/descend 20 step/stairs without onset of symptoms.   6 Initial 1/20/2022        PLAN   Plan of care Certification: 1/20/2022 to 4/11/2022.    Outpatient Physical Therapy 2 times weekly for 6 weeks to  include the following interventions: Manual Therapy, Neuromuscular Re-ed, Patient Education, Therapeutic Activities and Therapeutic Exercise.     Claudio Tolentino, PT      I CERTIFY THE NEED FOR THESE SERVICES FURNISHED UNDER THIS PLAN OF TREATMENT AND WHILE UNDER MY CARE   Physician's comments:     Physician's Signature: ___________________________________________________

## 2022-01-26 ENCOUNTER — CLINICAL SUPPORT (OUTPATIENT)
Dept: REHABILITATION | Facility: HOSPITAL | Age: 67
End: 2022-01-26
Payer: COMMERCIAL

## 2022-01-26 DIAGNOSIS — M25.662 DECREASED RANGE OF MOTION OF LEFT KNEE: ICD-10-CM

## 2022-01-26 DIAGNOSIS — M25.562 LEFT KNEE PAIN, UNSPECIFIED CHRONICITY: ICD-10-CM

## 2022-01-26 DIAGNOSIS — R29.898 IMPAIRED FLEXIBILITY OF LOWER EXTREMITY: ICD-10-CM

## 2022-01-26 DIAGNOSIS — R29.898 WEAKNESS OF LEFT LOWER EXTREMITY: ICD-10-CM

## 2022-01-26 PROCEDURE — 97110 THERAPEUTIC EXERCISES: CPT | Mod: HCNC,PN,97 | Performed by: PHYSICAL THERAPIST

## 2022-01-26 NOTE — PROGRESS NOTES
[facility]   Physical Therapy Treatment Note     Name: Mark Patino  [facility] Number: [id number]    Therapy Diagnosis:   Encounter Diagnoses   Name Primary?    Left knee pain, unspecified chronicity     Decreased range of motion of left knee     Impaired flexibility of lower extremity     Weakness of left lower extremity      Physician: Nilton Amor II, MD    Visit Date: 1/26/2022    Physician Orders: PT Eval and Treat   Medical Diagnosis from Referral: Left knee pain, unspecified chronicity  Evaluation Date: 1/20/2022  Authorization Period Expiration: 12/31/2022  Plan of Care Expiration: 4/11/2022  Progress Note Due: 2/19/2022  Visit # / Visits authorized: 2/ 20  (POC 2/12)  FOTO: 1/ 3      Time In: 1330  Time Out: 1415  Total Billable Time: 45 minutes    PTA Visit #: 0/5     Precautions: Standard, Diabetes and HTN    Insurance: Payor: HUMANA / Plan: HUMANA  PPO / Product Type: PPO /     SUBJECTIVE     Pt reports: continued left knee pain.  He was compliant with home exercise program.  Response to previous treatment: no complaints  Functional change: first visit since eval    Pain: 8/10  Location: left knee      OBJECTIVE     Objective Measures updated at progress report unless specified.     Treatment       DJ received the treatments listed below:      THERAPEUTIC EXERCISES to develop strength, endurance, ROM and flexibility for 45 minutes including :   Seated Hamstring stretch 3 x 30 sec  Seated Gastroc-soleus stretch 3 x 30 sec    Ottoman stretch 4 min 4#  Quad sets 3 x 10  Straight leg raise 3 x 10  Supine heel slides 3 x 10  Adductor squeeze 3 x 10  Hook lying abduction with Green theraband 3 x 10    NuStep Seat 17, Level 1.0, 6 minutes    Precor Leg Press seat 10, 3 x 10 60#  Precor Hamstring curl seat 2 3 x 10 50#    Standing Terminal Knee Extension with ball 3 x 10    Distracted knee flexion with strap x 20 Not performed seconary to time        Patient Education and Home  Exercises     Home Exercises Provided and Patient Education Provided     Education provided:   - Continue HEP    Written Home Exercises Provided: Patient instructed to cont prior HEP. Exercises were reviewed and ERNESTO was able to demonstrate them prior to the end of the session.  DJ demonstrated fair  understanding of the education provided. See EMR under Patient Instructions for exercises provided during therapy sessions    ASSESSMENT     The patient presents to PT with continued left knee pain. KT taping for anterior knee pain was applied and the patient noted decreased pain and increased support versus knee brace. Patient tolerated treatment well without report of adverse effects.      DJ Is progressing well towards his goals.   Pt prognosis is Good.     Pt will continue to benefit from skilled outpatient physical therapy to address the deficits listed in the problem list box on initial evaluation, provide pt/family education and to maximize pt's level of independence in the home and community environment.     Pt's spiritual, cultural and educational needs considered and pt agreeable to plan of care and goals.     Anticipated barriers to physical therapy: none    Goals:   Short Term Goals (STG) # weeks Goal Review Date Reviewed Date Met   1. The patient will begin a written HEP 1 progressing 1/26/2022       2. Increase knee ROM to -5* - 110* 3 progressing 1/26/2022       3. Decrease soft tissue tenderness to mild 3 progressing 1/26/2022          Long Term Goals (LTG) # weeks Goal Review Date Reviewed Date Met   1. The patient will be independent with a HEP for maintenance 6 progressing 1/26/2022       2. Increase knee ROM to 0* to 120* 6 progressing 1/26/2022       3. Increase knee strength to 5/5 6 progressing 1/26/2022       4. The patient will ambulate on a community level without AD . 6 progressing 1/26/2022       5. Decrease FOTO to < 32%.  6 progressing 1/26/2022       6. The patient will be able to  ascend/descend 20 step/stairs without onset of symptoms.    6 progressing 1/26/2022            PLAN     Continue with physical therapy as per plan of care      Claudio Tolentino, PT

## 2022-01-28 ENCOUNTER — CLINICAL SUPPORT (OUTPATIENT)
Dept: REHABILITATION | Facility: HOSPITAL | Age: 67
End: 2022-01-28
Payer: COMMERCIAL

## 2022-01-28 DIAGNOSIS — M25.662 DECREASED RANGE OF MOTION OF LEFT KNEE: ICD-10-CM

## 2022-01-28 DIAGNOSIS — R29.898 IMPAIRED FLEXIBILITY OF LOWER EXTREMITY: ICD-10-CM

## 2022-01-28 DIAGNOSIS — R29.898 WEAKNESS OF LEFT LOWER EXTREMITY: ICD-10-CM

## 2022-01-28 DIAGNOSIS — M25.562 LEFT KNEE PAIN, UNSPECIFIED CHRONICITY: ICD-10-CM

## 2022-01-28 PROCEDURE — 97110 THERAPEUTIC EXERCISES: CPT | Mod: HCNC,PN,CQ,97

## 2022-01-28 NOTE — PROGRESS NOTES
[facility]   Physical Therapy Treatment Note     Name: Mark Patino  [facility] Number: [id number]    Therapy Diagnosis:   Encounter Diagnoses   Name Primary?    Left knee pain, unspecified chronicity     Decreased range of motion of left knee     Impaired flexibility of lower extremity     Weakness of left lower extremity      Physician: Nilton Amor II, MD    Visit Date: 1/28/2022    Physician Orders: PT Eval and Treat   Medical Diagnosis from Referral: Left knee pain, unspecified chronicity  Evaluation Date: 1/20/2022  Authorization Period Expiration: 12/31/2022  Plan of Care Expiration: 4/11/2022  Progress Note Due: 2/19/2022  Visit # / Visits authorized: 3/ 20  (POC 3/12)  FOTO: 1/ 3      Time In: 1230  Time Out: 1315   Total Billable Time:   45 minutes    PTA Visit #: 1/5     Precautions: Standard, Diabetes and HTN    Insurance: Payor: HUMANA / Plan: HUMANA  PPO / Product Type: PPO /     SUBJECTIVE     Pt reports:  Knee pain feels much better since being taped   He was compliant with home exercise program.  Response to previous treatment: no complaints  Functional change: first visit since eval    Pain: 5/10  Location: left knee      OBJECTIVE     Objective Measures updated at progress report unless specified.     Treatment       DJ received the treatments listed below:      THERAPEUTIC EXERCISES to develop strength, endurance, ROM and flexibility for 45 minutes including :   Seated Hamstring stretch 3 x 30 sec  Seated Gastroc-soleus stretch 3 x 30 sec    Ottoman stretch 4 min 4#  Quad sets 3 x 10  Straight leg raise 3 x 10  Supine heel slides 3 x 10  Adductor squeeze 3 x 10  Hook lying abduction with Green theraband 3 x 10    NuStep Seat 17, Level 1.5, 6 minutes    Precor Leg Press seat 10, 3 x 10 60#  Precor Hamstring curl seat 2 3 x 10 50#    Standing Terminal Knee Extension with ball 3 x 10    Distracted knee flexion with strap x 20 Not performed seconary to time         Patient Education and Home Exercises     Home Exercises Provided and Patient Education Provided     Education provided:   - Continue HEP    Written Home Exercises Provided: Patient instructed to cont prior HEP. Exercises were reviewed and ERNESTO was able to demonstrate them prior to the end of the session.  DJ demonstrated fair  understanding of the education provided. See EMR under Patient Instructions for exercises provided during therapy sessions    ASSESSMENT     Continues to lack TKE requiring VC to achieve. Pt with good tolerance to therapy session with no adverse effects reported.          DJ Is progressing well towards his goals.   Pt prognosis is Good.     Pt will continue to benefit from skilled outpatient physical therapy to address the deficits listed in the problem list box on initial evaluation, provide pt/family education and to maximize pt's level of independence in the home and community environment.     Pt's spiritual, cultural and educational needs considered and pt agreeable to plan of care and goals.     Anticipated barriers to physical therapy: none    Goals:   Short Term Goals (STG) # weeks Goal Review Date Reviewed Date Met   1. The patient will begin a written HEP 1 progressing 1/28/2022       2. Increase knee ROM to -5* - 110* 3 progressing 1/28/2022       3. Decrease soft tissue tenderness to mild 3 progressing 1/28/2022          Long Term Goals (LTG) # weeks Goal Review Date Reviewed Date Met   1. The patient will be independent with a HEP for maintenance 6 progressing 1/28/2022       2. Increase knee ROM to 0* to 120* 6 progressing 1/28/2022       3. Increase knee strength to 5/5 6 progressing 1/28/2022       4. The patient will ambulate on a community level without AD . 6 progressing 1/28/2022       5. Decrease FOTO to < 32%.  6 progressing 1/28/2022       6. The patient will be able to ascend/descend 20 step/stairs without onset of symptoms.    6 progressing 1/28/2022            PLAN      Continue with physical therapy as per plan of care      Toño Cary, PTA

## 2022-01-31 ENCOUNTER — CLINICAL SUPPORT (OUTPATIENT)
Dept: REHABILITATION | Facility: HOSPITAL | Age: 67
End: 2022-01-31
Payer: COMMERCIAL

## 2022-01-31 DIAGNOSIS — M25.562 LEFT KNEE PAIN, UNSPECIFIED CHRONICITY: ICD-10-CM

## 2022-01-31 DIAGNOSIS — M25.662 DECREASED RANGE OF MOTION OF LEFT KNEE: ICD-10-CM

## 2022-01-31 DIAGNOSIS — R29.898 WEAKNESS OF LEFT LOWER EXTREMITY: ICD-10-CM

## 2022-01-31 DIAGNOSIS — R29.898 IMPAIRED FLEXIBILITY OF LOWER EXTREMITY: ICD-10-CM

## 2022-01-31 PROCEDURE — 97110 THERAPEUTIC EXERCISES: CPT | Mod: HCNC,PN,CQ

## 2022-01-31 RX ORDER — INSULIN DETEMIR 100 [IU]/ML
INJECTION, SOLUTION SUBCUTANEOUS
Qty: 30 ML | Refills: 3 | Status: SHIPPED | OUTPATIENT
Start: 2022-01-31 | End: 2022-08-01

## 2022-01-31 RX ORDER — INSULIN DETEMIR 100 [IU]/ML
INJECTION, SOLUTION SUBCUTANEOUS
Qty: 1 ML | Refills: 0 | OUTPATIENT
Start: 2022-01-31

## 2022-01-31 NOTE — PROGRESS NOTES
"[facility]   Physical Therapy Treatment Note     Name: Mark Patino  [facility] Number: [id number]    Therapy Diagnosis:   Encounter Diagnoses   Name Primary?    Left knee pain, unspecified chronicity     Decreased range of motion of left knee     Impaired flexibility of lower extremity     Weakness of left lower extremity      Physician: Nilton Amor II, MD    Visit Date: 1/31/2022    Physician Orders: PT Eval and Treat   Medical Diagnosis from Referral: Left knee pain, unspecified chronicity  Evaluation Date: 1/20/2022  Authorization Period Expiration: 12/31/2022  Plan of Care Expiration: 4/11/2022  Progress Note Due: 2/19/2022  Visit # / Visits authorized: 4/ 20  (POC 4/12)  FOTO: 1/ 3      Time In: 1231  Time Out: 1315   Total Billable Time:   44 minutes    PTA Visit #: 2/5     Precautions: Standard, Diabetes and HTN    Insurance: Payor: HUMANA / Plan: HUMANA  PPO / Product Type: PPO /     SUBJECTIVE     Pt reports: "I have 6.5/10 pain" but was unable to say where it was hurting. He then stated it does not hurt he just feels "a little sore on the inside" .   He was compliant with home exercise program.  Response to previous treatment: no complaints  Functional change: first visit since eval    Pain: 6/10  Location: left knee      OBJECTIVE     Objective Measures updated at progress report unless specified.     Treatment       DJ received the treatments listed below:      THERAPEUTIC EXERCISES to develop strength, endurance, ROM and flexibility for 44 minutes including :     NuStep Seat 17, Level 2, 6 minutes  Seated Hamstring stretch 3 x 30 sec  Standing Gastroc-soleus stretch 3 x 30 sec    Ottoman stretch 4 min 4#  Quad sets 3 x 10  Straight leg raise 3 x 10  Supine heel slides 3 x 10  Adductor squeeze 3 x 10  Hook lying abduction with Green theraband 3 x 10    Precor Leg Press seat 10, 3 x 10 60#  Precor Hamstring curl seat 2 3 x 10 50#    Standing Terminal Knee Extension with " ball 3 x 10    Taping for patellar stability     Patient Education and Home Exercises     Home Exercises Provided and Patient Education Provided     Education provided:   - Continue HEP    Written Home Exercises Provided: Patient instructed to cont prior HEP. Exercises were reviewed and DJ was able to demonstrate them prior to the end of the session.  DJ demonstrated fair  understanding of the education provided. See EMR under Patient Instructions for exercises provided during therapy sessions    ASSESSMENT     Pt continues to complain of knee pain primarily at medial epicondyle. States taping does help therefore re-applied today. Pt with good tolerance to therapy session with no adverse effects reported.        DJ Is progressing well towards his goals.   Pt prognosis is Good.     Pt will continue to benefit from skilled outpatient physical therapy to address the deficits listed in the problem list box on initial evaluation, provide pt/family education and to maximize pt's level of independence in the home and community environment.     Pt's spiritual, cultural and educational needs considered and pt agreeable to plan of care and goals.     Anticipated barriers to physical therapy: none    Goals:   Short Term Goals (STG) # weeks Goal Review Date Reviewed Date Met   1. The patient will begin a written HEP 1 progressing 1/31/2022       2. Increase knee ROM to -5* - 110* 3 progressing 1/31/2022       3. Decrease soft tissue tenderness to mild 3 progressing 1/31/2022          Long Term Goals (LTG) # weeks Goal Review Date Reviewed Date Met   1. The patient will be independent with a HEP for maintenance 6 progressing 1/31/2022       2. Increase knee ROM to 0* to 120* 6 progressing 1/31/2022       3. Increase knee strength to 5/5 6 progressing 1/31/2022       4. The patient will ambulate on a community level without AD . 6 progressing 1/31/2022       5. Decrease FOTO to < 32%.  6 progressing 1/31/2022       6. The patient  will be able to ascend/descend 20 step/stairs without onset of symptoms.    6 progressing 1/31/2022            PLAN     Continue with physical therapy as per plan of care      Toño Cary PTA

## 2022-02-07 ENCOUNTER — CLINICAL SUPPORT (OUTPATIENT)
Dept: REHABILITATION | Facility: HOSPITAL | Age: 67
End: 2022-02-07
Payer: COMMERCIAL

## 2022-02-07 DIAGNOSIS — R29.898 WEAKNESS OF LEFT LOWER EXTREMITY: ICD-10-CM

## 2022-02-07 DIAGNOSIS — M25.662 DECREASED RANGE OF MOTION OF LEFT KNEE: ICD-10-CM

## 2022-02-07 DIAGNOSIS — R29.898 IMPAIRED FLEXIBILITY OF LOWER EXTREMITY: ICD-10-CM

## 2022-02-07 DIAGNOSIS — M25.562 LEFT KNEE PAIN, UNSPECIFIED CHRONICITY: ICD-10-CM

## 2022-02-07 PROCEDURE — 97110 THERAPEUTIC EXERCISES: CPT | Mod: HCNC,PN | Performed by: PHYSICAL THERAPIST

## 2022-02-07 NOTE — PROGRESS NOTES
[facility]   Physical Therapy Treatment Note     Name: Mark Patino  [facility] Number: [id number]    Therapy Diagnosis:   Encounter Diagnoses   Name Primary?    Left knee pain, unspecified chronicity     Decreased range of motion of left knee     Impaired flexibility of lower extremity     Weakness of left lower extremity      Physician: Nilton Amor II, MD    Visit Date: 2/7/2022    Physician Orders: PT Eval and Treat   Medical Diagnosis from Referral: Left knee pain, unspecified chronicity  Evaluation Date: 1/20/2022  Authorization Period Expiration: 12/31/2022  Plan of Care Expiration: 4/11/2022  Progress Note Due: 2/19/2022  Visit # / Visits authorized: 5/ 20  (POC 5/12)  FOTO: 1/ 3      Time In: 1330  Time Out: 1315   Total Billable Time:   45 minutes    PTA Visit #: 0/5     Precautions: Standard, Diabetes and HTN    Insurance: Payor: HUMANA / Plan: HUMANA  PPO / Product Type: PPO /     SUBJECTIVE     Pt reports: medial knee pain today but he did a lot of walking while in [place] last week .   He was compliant with home exercise program.  Response to previous treatment: no complaints  Functional change: first visit since eval    Pain: 5-6/10  Location: left knee      OBJECTIVE     Objective Measures updated at progress report unless specified.     Treatment       DJ received the treatments listed below:      THERAPEUTIC EXERCISES to develop strength, endurance, ROM and flexibility for 45 minutes including :     NuStep Seat 17, Level 2, 6 minutes  Seated Hamstring stretch 3 x 30 sec  Standing Gastroc-soleus stretch 3 x 30 sec    Ottoman stretch 4 min 4#  Quad sets 3 x 10  Straight leg raise 3 x 10  Supine heel slides 3 x 10  Adductor squeeze 3 x 10  Hook lying abduction with Green theraband 3 x 10    Precor Leg Press seat 10, 3 x 10 60#  Precor Hamstring curl seat 2 3 x 10 50#    Standing Terminal Knee Extension with ball 3 x 10    Taping for patellar stability     Patient  Education and Home Exercises     Home Exercises Provided and Patient Education Provided     Education provided:   - Continue HEP    Written Home Exercises Provided: Patient instructed to cont prior HEP. Exercises were reviewed and ERNESTO was able to demonstrate them prior to the end of the session.  DJ demonstrated fair  understanding of the education provided. See EMR under Patient Instructions for exercises provided during therapy sessions    ASSESSMENT     Patient with continued medial knee pain. Decreased pain with taping reported. Pt with good tolerance to therapy session with no adverse effects reported.        DJ Is progressing well towards his goals.   Pt prognosis is Good.     Pt will continue to benefit from skilled outpatient physical therapy to address the deficits listed in the problem list box on initial evaluation, provide pt/family education and to maximize pt's level of independence in the home and community environment.     Pt's spiritual, cultural and educational needs considered and pt agreeable to plan of care and goals.     Anticipated barriers to physical therapy: none    Goals:   Short Term Goals (STG) # weeks Goal Review Date Reviewed Date Met   1. The patient will begin a written HEP 1 progressing 2/7/2022       2. Increase knee ROM to -5* - 110* 3 progressing 2/7/2022       3. Decrease soft tissue tenderness to mild 3 progressing 2/7/2022          Long Term Goals (LTG) # weeks Goal Review Date Reviewed Date Met   1. The patient will be independent with a HEP for maintenance 6 progressing 2/7/2022       2. Increase knee ROM to 0* to 120* 6 progressing 2/7/2022       3. Increase knee strength to 5/5 6 progressing 2/7/2022       4. The patient will ambulate on a community level without AD . 6 progressing 2/7/2022       5. Decrease FOTO to < 32%.  6 progressing 2/7/2022       6. The patient will be able to ascend/descend 20 step/stairs without onset of symptoms.    6 progressing 2/7/2022             PLAN     Continue with physical therapy as per plan of care      Claudio Tolentino, PT

## 2022-02-10 ENCOUNTER — CLINICAL SUPPORT (OUTPATIENT)
Dept: REHABILITATION | Facility: HOSPITAL | Age: 67
End: 2022-02-10
Payer: COMMERCIAL

## 2022-02-10 DIAGNOSIS — M25.662 DECREASED RANGE OF MOTION OF LEFT KNEE: ICD-10-CM

## 2022-02-10 DIAGNOSIS — R29.898 IMPAIRED FLEXIBILITY OF LOWER EXTREMITY: ICD-10-CM

## 2022-02-10 DIAGNOSIS — R29.898 WEAKNESS OF LEFT LOWER EXTREMITY: ICD-10-CM

## 2022-02-10 DIAGNOSIS — M25.562 LEFT KNEE PAIN, UNSPECIFIED CHRONICITY: ICD-10-CM

## 2022-02-10 PROCEDURE — 97110 THERAPEUTIC EXERCISES: CPT | Mod: HCNC,PN,CQ,97

## 2022-02-10 NOTE — PROGRESS NOTES
[facility]   Physical Therapy Treatment Note     Name: Mark Patino  [facility] Number: [id number]    Therapy Diagnosis:   Encounter Diagnoses   Name Primary?    Left knee pain, unspecified chronicity     Decreased range of motion of left knee     Impaired flexibility of lower extremity     Weakness of left lower extremity      Physician: Nilton Amor II, MD    Visit Date: 2/10/2022    Physician Orders: PT Eval and Treat   Medical Diagnosis from Referral: Left knee pain, unspecified chronicity  Evaluation Date: 1/20/2022  Authorization Period Expiration: 12/31/2022  Plan of Care Expiration: 4/11/2022  Progress Note Due: 2/19/2022  Visit # / Visits authorized: 6/ 20  (POC 6/12)  FOTO: 1/ 3      Time In: 1302  Time Out: 1345   Total Billable Time:   43 minutes    PTA Visit #: 1/5     Precautions: Standard, Diabetes and HTN    Insurance: Payor: HUMANA / Plan: HUMANA  PPO / Product Type: PPO /     SUBJECTIVE     Pt reports: States he hasn't had much   He was compliant with home exercise program.  Response to previous treatment: no complaints  Functional change: first visit since eval    Pain: 0/10  Location: left knee      OBJECTIVE     Objective Measures updated at progress report unless specified.     Treatment       DJ received the treatments listed below:      THERAPEUTIC EXERCISES to develop strength, endurance, ROM and flexibility for 43 minutes including :     NuStep Seat 17, Level 2, 6 minutes  Standing Gastroc-soleus stretch 3 x 30 sec  Seated Hamstring stretch 3 x 30 sec      Ottoman stretch 4 min 4#  Quad sets 3 x 10  Straight leg raise 3 x 10  Supine heel slides 3 x 10  Adductor squeeze 3 x 10  Hook lying abduction with Green theraband 3 x 10    Precor Leg Press seat 10, 3 x 10 60#  Precor Hamstring curl seat 2 3 x 10 50#    Standing Terminal Knee Extension with ball 3 x 10    Taping for patellar stability     Patient Education and Home Exercises     Home Exercises Provided  and Patient Education Provided     Education provided:   - Continue HEP    Written Home Exercises Provided: Patient instructed to cont prior HEP. Exercises were reviewed and DJ was able to demonstrate them prior to the end of the session.  DJ demonstrated fair  understanding of the education provided. See EMR under Patient Instructions for exercises provided during therapy sessions    ASSESSMENT     Patient with continued medial knee pain. Decreased pain with taping reported. Pt with good tolerance to therapy session with no adverse effects reported.        DJ Is progressing well towards his goals.   Pt prognosis is Good.     Pt will continue to benefit from skilled outpatient physical therapy to address the deficits listed in the problem list box on initial evaluation, provide pt/family education and to maximize pt's level of independence in the home and community environment.     Pt's spiritual, cultural and educational needs considered and pt agreeable to plan of care and goals.     Anticipated barriers to physical therapy: none    Goals:   Short Term Goals (STG) # weeks Goal Review Date Reviewed Date Met   1. The patient will begin a written HEP 1 progressing 2/10/2022       2. Increase knee ROM to -5* - 110* 3 progressing 2/10/2022       3. Decrease soft tissue tenderness to mild 3 progressing 2/10/2022          Long Term Goals (LTG) # weeks Goal Review Date Reviewed Date Met   1. The patient will be independent with a HEP for maintenance 6 progressing 2/10/2022       2. Increase knee ROM to 0* to 120* 6 progressing 2/10/2022       3. Increase knee strength to 5/5 6 progressing 2/10/2022       4. The patient will ambulate on a community level without AD . 6 progressing 2/10/2022       5. Decrease FOTO to < 32%.  6 progressing 2/10/2022       6. The patient will be able to ascend/descend 20 step/stairs without onset of symptoms.    6 progressing 2/10/2022            PLAN     Continue with physical therapy as per  plan of care      Toño Cary, PTA

## 2022-02-14 ENCOUNTER — CLINICAL SUPPORT (OUTPATIENT)
Dept: REHABILITATION | Facility: HOSPITAL | Age: 67
End: 2022-02-14
Payer: COMMERCIAL

## 2022-02-14 DIAGNOSIS — M25.662 DECREASED RANGE OF MOTION OF LEFT KNEE: ICD-10-CM

## 2022-02-14 DIAGNOSIS — R29.898 WEAKNESS OF LEFT LOWER EXTREMITY: ICD-10-CM

## 2022-02-14 DIAGNOSIS — M25.562 LEFT KNEE PAIN, UNSPECIFIED CHRONICITY: ICD-10-CM

## 2022-02-14 DIAGNOSIS — R29.898 IMPAIRED FLEXIBILITY OF LOWER EXTREMITY: ICD-10-CM

## 2022-02-14 PROCEDURE — 97110 THERAPEUTIC EXERCISES: CPT | Mod: HCNC,PN,CQ

## 2022-02-14 NOTE — PROGRESS NOTES
[facility]   Physical Therapy Treatment Note     Name: Mark Patino  [facility] Number: [id number]    Therapy Diagnosis:   Encounter Diagnoses   Name Primary?    Left knee pain, unspecified chronicity     Decreased range of motion of left knee     Impaired flexibility of lower extremity     Weakness of left lower extremity      Physician: Nilton Amor II, MD    Visit Date: 2/14/2022    Physician Orders: PT Eval and Treat   Medical Diagnosis from Referral: Left knee pain, unspecified chronicity  Evaluation Date: 1/20/2022  Authorization Period Expiration: 12/31/2022  Plan of Care Expiration: 4/11/2022  Progress Note Due: 2/19/2022  Visit # / Visits authorized: 7/ 20  (POC 7/12)  FOTO: 1/ 3      Time In: 1235  Time Out: 1315   Total Billable Time:   40 minutes    PTA Visit #: 2/5     Precautions: Standard, Diabetes and HTN    Insurance: Payor: HUMANA / Plan: HUMANA  PPO / Product Type: PPO /     SUBJECTIVE     Pt reports: Has no pain until he comes to therapy    He was compliant with home exercise program.  Response to previous treatment: no complaints  Functional change: decreased pain    Pain: 4/10  Location: left knee      OBJECTIVE     Objective Measures updated at progress report unless specified.     Treatment       DJ received the treatments listed below:      THERAPEUTIC EXERCISES to develop strength, endurance, ROM and flexibility for 40 minutes including :     NuStep Seat 17, Level 4, 6 minutes  Standing Gastroc-soleus stretch 3 x 30 sec  Seated Hamstring stretch 3 x 30 sec      Ottoman stretch 4 min 4#  Quad sets 3 x 10  Straight leg raise 3 x 10  Bridges, 3 x 10   Hook lying abduction with Green theraband 3 x 10  Supine heel slides 3 x 10  Adductor squeeze 3 x 10      Precor Leg Press seat 10, 3 x 10 60#  Precor Hamstring curl seat 2 3 x 10 50#    Standing Terminal Knee Extension with ball 3 x 10    Taping for patellar stability     Patient Education and Home Exercises      Home Exercises Provided and Patient Education Provided     Education provided:   - Continue HEP    Written Home Exercises Provided: Patient instructed to cont prior HEP. Exercises were reviewed and DJ was able to demonstrate them prior to the end of the session.  DJ demonstrated fair  understanding of the education provided. See EMR under Patient Instructions for exercises provided during therapy sessions    ASSESSMENT     Decreased pain with taping reported. Pt reported pain at medial knee following side lying clamshells.       DJ Is progressing well towards his goals.   Pt prognosis is Good.     Pt will continue to benefit from skilled outpatient physical therapy to address the deficits listed in the problem list box on initial evaluation, provide pt/family education and to maximize pt's level of independence in the home and community environment.     Pt's spiritual, cultural and educational needs considered and pt agreeable to plan of care and goals.     Anticipated barriers to physical therapy: none    Goals:   Short Term Goals (STG) # weeks Goal Review Date Reviewed Date Met   1. The patient will begin a written HEP 1 progressing 2/14/2022       2. Increase knee ROM to -5* - 110* 3 progressing 2/14/2022       3. Decrease soft tissue tenderness to mild 3 progressing 2/14/2022          Long Term Goals (LTG) # weeks Goal Review Date Reviewed Date Met   1. The patient will be independent with a HEP for maintenance 6 progressing 2/14/2022       2. Increase knee ROM to 0* to 120* 6 progressing 2/14/2022       3. Increase knee strength to 5/5 6 progressing 2/14/2022       4. The patient will ambulate on a community level without AD . 6 progressing 2/14/2022       5. Decrease FOTO to < 32%.  6 progressing 2/14/2022       6. The patient will be able to ascend/descend 20 step/stairs without onset of symptoms.    6 progressing 2/14/2022            PLAN     Continue with physical therapy as per plan of care      Toño  Luis Daniel, PTA

## 2022-02-17 ENCOUNTER — CLINICAL SUPPORT (OUTPATIENT)
Dept: REHABILITATION | Facility: HOSPITAL | Age: 67
End: 2022-02-17
Payer: COMMERCIAL

## 2022-02-17 DIAGNOSIS — R29.898 WEAKNESS OF LEFT LOWER EXTREMITY: ICD-10-CM

## 2022-02-17 DIAGNOSIS — M25.662 DECREASED RANGE OF MOTION OF LEFT KNEE: ICD-10-CM

## 2022-02-17 DIAGNOSIS — M25.562 LEFT KNEE PAIN, UNSPECIFIED CHRONICITY: ICD-10-CM

## 2022-02-17 DIAGNOSIS — R29.898 IMPAIRED FLEXIBILITY OF LOWER EXTREMITY: ICD-10-CM

## 2022-02-17 PROCEDURE — 97110 THERAPEUTIC EXERCISES: CPT | Mod: HCNC,PN,CQ,97

## 2022-02-17 NOTE — PROGRESS NOTES
[facility]   Physical Therapy Treatment Note     Name: Mark Patino  [facility] Number: [id number]    Therapy Diagnosis:   Encounter Diagnoses   Name Primary?    Left knee pain, unspecified chronicity     Decreased range of motion of left knee     Impaired flexibility of lower extremity     Weakness of left lower extremity      Physician: Nilton Amor II, MD    Visit Date: 2/17/2022    Physician Orders: PT Eval and Treat   Medical Diagnosis from Referral: Left knee pain, unspecified chronicity  Evaluation Date: 1/20/2022  Authorization Period Expiration: 12/31/2022  Plan of Care Expiration: 4/11/2022  Progress Note Due: 2/19/2022  Visit # / Visits authorized: 7/ 20  (POC 7/12)  FOTO: 1/ 3      Time In: 1300  Time Out: 1343  Total Billable Time:   43 minutes    PTA Visit #: 3/5     Precautions: Standard, Diabetes and HTN    Insurance: Payor: HUMANA / Plan: HUMANA  PPO / Product Type: PPO /     SUBJECTIVE     Pt reports: No new complaints   He was compliant with home exercise program.  Response to previous treatment: no complaints  Functional change: decreased pain    Pain: 4 /10  Location: left knee      OBJECTIVE     Objective Measures updated at progress report unless specified.     Treatment   Knee AAROM 2/17/2022 : 0 - 3 - 106       DJ received the treatments listed below:      THERAPEUTIC EXERCISES to develop strength, endurance, ROM and flexibility for 43 minutes including :     Upright bike, 6 minutes  Standing Gastroc-soleus stretch 3 x 30 sec  Seated Hamstring stretch 3 x 30 sec      Ottoman stretch 4 min    Quad sets 3 x 10  Straight leg raise 3 x 10  Bridges, 3 x 10   Hook lying abduction with Green theraband 3 x 10  Supine heel slides 3 x 10  Adductor squeeze 3 x 10      Precor Leg Press seat 10, 3 x 10 60#  Precor Hamstring curl seat 2 3 x 10 50#    Standing Terminal Knee Extension with ball 3 x 10    Taping for patellar stability     Patient Education and Home Exercises      Home Exercises Provided and Patient Education Provided     Education provided:   - Continue HEP    Written Home Exercises Provided: Patient instructed to cont prior HEP. Exercises were reviewed and ERNESTO was able to demonstrate them prior to the end of the session.  DJ demonstrated fair  understanding of the education provided. See EMR under Patient Instructions for exercises provided during therapy sessions    ASSESSMENT     Knee AAROM 0-3-106 with pain at end range with hard end feel. Pt with good tolerance to therapy session with no adverse effects reported.           DJ Is progressing well towards his goals.   Pt prognosis is Good.     Pt will continue to benefit from skilled outpatient physical therapy to address the deficits listed in the problem list box on initial evaluation, provide pt/family education and to maximize pt's level of independence in the home and community environment.     Pt's spiritual, cultural and educational needs considered and pt agreeable to plan of care and goals.     Anticipated barriers to physical therapy: none    Goals:   Short Term Goals (STG) # weeks Goal Review Date Reviewed Date Met   1. The patient will begin a written HEP 1 progressing 2/17/2022       2. Increase knee ROM to -5* - 110* 3 progressing 2/17/2022       3. Decrease soft tissue tenderness to mild 3 MET   2/17/2022         Long Term Goals (LTG) # weeks Goal Review Date Reviewed Date Met   1. The patient will be independent with a HEP for maintenance 6 progressing 2/17/2022       2. Increase knee ROM to 0* to 120* 6 progressing 2/17/2022       3. Increase knee strength to 5/5 6 progressing 2/17/2022       4. The patient will ambulate on a community level without AD . 6 progressing 2/17/2022       5. Decrease FOTO to < 32%.  6 progressing 2/17/2022       6. The patient will be able to ascend/descend 20 step/stairs without onset of symptoms.    6 progressing 2/17/2022            PLAN     Continue with physical  therapy as per plan of care      Toño Cary, PTA

## 2022-02-23 ENCOUNTER — PATIENT OUTREACH (OUTPATIENT)
Dept: ADMINISTRATIVE | Facility: OTHER | Age: 67
End: 2022-02-23
Payer: COMMERCIAL

## 2022-02-23 NOTE — PROGRESS NOTES
Chart was reviewed for overdue [facility]active [facility] Encounters ([facility])  topics  Updates were requested from care everywhere  Health Maintenance has been updated  [place] immunization registry triggered

## 2022-02-28 ENCOUNTER — TELEPHONE (OUTPATIENT)
Dept: ORTHOPEDICS | Facility: CLINIC | Age: 67
End: 2022-02-28
Payer: COMMERCIAL

## 2022-04-21 RX ORDER — PEN NEEDLE, DIABETIC 32GX 5/32"
NEEDLE, DISPOSABLE MISCELLANEOUS
Qty: 100 EACH | Refills: 11 | Status: SHIPPED | OUTPATIENT
Start: 2022-04-21 | End: 2022-12-30 | Stop reason: SDUPTHER

## 2022-04-21 RX ORDER — METFORMIN HYDROCHLORIDE 1000 MG/1
TABLET ORAL
Qty: 180 TABLET | Refills: 0 | Status: SHIPPED | OUTPATIENT
Start: 2022-04-21 | End: 2022-05-06 | Stop reason: SDUPTHER

## 2022-04-29 ENCOUNTER — LAB VISIT (OUTPATIENT)
Dept: LAB | Facility: HOSPITAL | Age: 67
End: 2022-04-29
Attending: PHYSICIAN ASSISTANT
Payer: COMMERCIAL

## 2022-04-29 LAB
ALBUMIN SERPL BCP-MCNC: 3.9 G/DL (ref 3.5–5.2)
ALP SERPL-CCNC: 294 U/L (ref 55–135)
ALT SERPL W/O P-5'-P-CCNC: 27 U/L (ref 10–44)
ANION GAP SERPL CALC-SCNC: 10 MMOL/L (ref 8–16)
AST SERPL-CCNC: 27 U/L (ref 10–40)
BILIRUB SERPL-MCNC: 0.5 MG/DL (ref 0.1–1)
BUN SERPL-MCNC: 15 MG/DL (ref 8–23)
CALCIUM SERPL-MCNC: 9.8 MG/DL (ref 8.7–10.5)
CHLORIDE SERPL-SCNC: 103 MMOL/L (ref 95–110)
CHOLEST SERPL-MCNC: 177 MG/DL (ref 120–199)
CHOLEST/HDLC SERPL: 4.7 {RATIO} (ref 2–5)
CO2 SERPL-SCNC: 28 MMOL/L (ref 23–29)
CREAT SERPL-MCNC: 0.9 MG/DL (ref 0.5–1.4)
EST. GFR  (AFRICAN AMERICAN): >60 ML/MIN/1.73 M^2
EST. GFR  (NON AFRICAN AMERICAN): >60 ML/MIN/1.73 M^2
ESTIMATED AVG GLUCOSE: 123 MG/DL (ref 68–131)
GLUCOSE SERPL-MCNC: 94 MG/DL (ref 70–110)
HBA1C MFR BLD: 5.9 % (ref 4–5.6)
HDLC SERPL-MCNC: 38 MG/DL (ref 40–75)
HDLC SERPL: 21.5 % (ref 20–50)
LDLC SERPL CALC-MCNC: 128 MG/DL (ref 63–159)
NONHDLC SERPL-MCNC: 139 MG/DL
POTASSIUM SERPL-SCNC: 4.5 MMOL/L (ref 3.5–5.1)
PROT SERPL-MCNC: 7.3 G/DL (ref 6–8.4)
SODIUM SERPL-SCNC: 141 MMOL/L (ref 136–145)
T4 FREE SERPL-MCNC: 0.98 NG/DL (ref 0.71–1.51)
TRIGL SERPL-MCNC: 55 MG/DL (ref 30–150)
TSH SERPL DL<=0.005 MIU/L-ACNC: 1.16 UIU/ML (ref 0.4–4)

## 2022-04-29 PROCEDURE — 80061 LIPID PANEL: CPT | Mod: HCNC | Performed by: PHYSICIAN ASSISTANT

## 2022-04-29 PROCEDURE — 36415 COLL VENOUS BLD VENIPUNCTURE: CPT | Mod: HCNC,PO | Performed by: PHYSICIAN ASSISTANT

## 2022-04-29 PROCEDURE — 80053 COMPREHEN METABOLIC PANEL: CPT | Mod: HCNC | Performed by: PHYSICIAN ASSISTANT

## 2022-04-29 PROCEDURE — 83036 HEMOGLOBIN GLYCOSYLATED A1C: CPT | Mod: HCNC | Performed by: PHYSICIAN ASSISTANT

## 2022-04-29 PROCEDURE — 84439 ASSAY OF FREE THYROXINE: CPT | Mod: HCNC | Performed by: PHYSICIAN ASSISTANT

## 2022-04-29 PROCEDURE — 84443 ASSAY THYROID STIM HORMONE: CPT | Mod: HCNC | Performed by: PHYSICIAN ASSISTANT

## 2022-05-03 ENCOUNTER — PATIENT OUTREACH (OUTPATIENT)
Dept: ADMINISTRATIVE | Facility: OTHER | Age: 67
End: 2022-05-03
Payer: COMMERCIAL

## 2022-05-03 DIAGNOSIS — Z12.11 ENCOUNTER FOR FIT (FECAL IMMUNOCHEMICAL TEST) SCREENING: Primary | ICD-10-CM

## 2022-05-06 ENCOUNTER — OFFICE VISIT (OUTPATIENT)
Dept: ENDOCRINOLOGY | Facility: CLINIC | Age: 67
End: 2022-05-06
Payer: COMMERCIAL

## 2022-05-06 VITALS
OXYGEN SATURATION: 97 % | BODY MASS INDEX: 27.33 KG/M2 | HEIGHT: 69 IN | WEIGHT: 184.5 LBS | TEMPERATURE: 98 F | DIASTOLIC BLOOD PRESSURE: 80 MMHG | SYSTOLIC BLOOD PRESSURE: 130 MMHG | HEART RATE: 87 BPM

## 2022-05-06 DIAGNOSIS — B35.1 ONYCHOMYCOSIS: ICD-10-CM

## 2022-05-06 DIAGNOSIS — E55.9 HYPOVITAMINOSIS D: ICD-10-CM

## 2022-05-06 DIAGNOSIS — E66.9 OBESITY (BMI 30-39.9): ICD-10-CM

## 2022-05-06 DIAGNOSIS — E78.5 HYPERLIPIDEMIA, UNSPECIFIED HYPERLIPIDEMIA TYPE: ICD-10-CM

## 2022-05-06 DIAGNOSIS — N52.9 ERECTILE DYSFUNCTION, UNSPECIFIED ERECTILE DYSFUNCTION TYPE: ICD-10-CM

## 2022-05-06 LAB — GLUCOSE SERPL-MCNC: 81 MG/DL (ref 70–110)

## 2022-05-06 PROCEDURE — 99214 PR OFFICE/OUTPT VISIT, EST, LEVL IV, 30-39 MIN: ICD-10-PCS | Mod: HCNC,S$GLB,, | Performed by: PHYSICIAN ASSISTANT

## 2022-05-06 PROCEDURE — 3075F SYST BP GE 130 - 139MM HG: CPT | Mod: HCNC,CPTII,S$GLB, | Performed by: PHYSICIAN ASSISTANT

## 2022-05-06 PROCEDURE — 99214 OFFICE O/P EST MOD 30 MIN: CPT | Mod: HCNC,S$GLB,, | Performed by: PHYSICIAN ASSISTANT

## 2022-05-06 PROCEDURE — 3008F PR BODY MASS INDEX (BMI) DOCUMENTED: ICD-10-PCS | Mod: HCNC,CPTII,S$GLB, | Performed by: PHYSICIAN ASSISTANT

## 2022-05-06 PROCEDURE — 3008F BODY MASS INDEX DOCD: CPT | Mod: HCNC,CPTII,S$GLB, | Performed by: PHYSICIAN ASSISTANT

## 2022-05-06 PROCEDURE — 3079F PR MOST RECENT DIASTOLIC BLOOD PRESSURE 80-89 MM HG: ICD-10-PCS | Mod: HCNC,CPTII,S$GLB, | Performed by: PHYSICIAN ASSISTANT

## 2022-05-06 PROCEDURE — 3072F PR LOW RISK FOR RETINOPATHY: ICD-10-PCS | Mod: HCNC,CPTII,S$GLB, | Performed by: PHYSICIAN ASSISTANT

## 2022-05-06 PROCEDURE — 3072F LOW RISK FOR RETINOPATHY: CPT | Mod: HCNC,CPTII,S$GLB, | Performed by: PHYSICIAN ASSISTANT

## 2022-05-06 PROCEDURE — 99999 PR PBB SHADOW E&M-EST. PATIENT-LVL IV: CPT | Mod: PBBFAC,HCNC,, | Performed by: PHYSICIAN ASSISTANT

## 2022-05-06 PROCEDURE — 99999 PR PBB SHADOW E&M-EST. PATIENT-LVL IV: ICD-10-PCS | Mod: PBBFAC,HCNC,, | Performed by: PHYSICIAN ASSISTANT

## 2022-05-06 PROCEDURE — 82962 POCT GLUCOSE, HAND-HELD DEVICE: ICD-10-PCS | Mod: HCNC,S$GLB,, | Performed by: PHYSICIAN ASSISTANT

## 2022-05-06 PROCEDURE — 3288F PR FALLS RISK ASSESSMENT DOCUMENTED: ICD-10-PCS | Mod: HCNC,CPTII,S$GLB, | Performed by: PHYSICIAN ASSISTANT

## 2022-05-06 PROCEDURE — 82962 GLUCOSE BLOOD TEST: CPT | Mod: HCNC,S$GLB,, | Performed by: PHYSICIAN ASSISTANT

## 2022-05-06 PROCEDURE — 1159F MED LIST DOCD IN RCRD: CPT | Mod: HCNC,CPTII,S$GLB, | Performed by: PHYSICIAN ASSISTANT

## 2022-05-06 PROCEDURE — 1159F PR MEDICATION LIST DOCUMENTED IN MEDICAL RECORD: ICD-10-PCS | Mod: HCNC,CPTII,S$GLB, | Performed by: PHYSICIAN ASSISTANT

## 2022-05-06 PROCEDURE — 3288F FALL RISK ASSESSMENT DOCD: CPT | Mod: HCNC,CPTII,S$GLB, | Performed by: PHYSICIAN ASSISTANT

## 2022-05-06 PROCEDURE — 1101F PR PT FALLS ASSESS DOC 0-1 FALLS W/OUT INJ PAST YR: ICD-10-PCS | Mod: HCNC,CPTII,S$GLB, | Performed by: PHYSICIAN ASSISTANT

## 2022-05-06 PROCEDURE — 3075F PR MOST RECENT SYSTOLIC BLOOD PRESS GE 130-139MM HG: ICD-10-PCS | Mod: HCNC,CPTII,S$GLB, | Performed by: PHYSICIAN ASSISTANT

## 2022-05-06 PROCEDURE — 3044F PR MOST RECENT HEMOGLOBIN A1C LEVEL <7.0%: ICD-10-PCS | Mod: HCNC,CPTII,S$GLB, | Performed by: PHYSICIAN ASSISTANT

## 2022-05-06 PROCEDURE — 3079F DIAST BP 80-89 MM HG: CPT | Mod: HCNC,CPTII,S$GLB, | Performed by: PHYSICIAN ASSISTANT

## 2022-05-06 PROCEDURE — 1126F AMNT PAIN NOTED NONE PRSNT: CPT | Mod: HCNC,CPTII,S$GLB, | Performed by: PHYSICIAN ASSISTANT

## 2022-05-06 PROCEDURE — 3044F HG A1C LEVEL LT 7.0%: CPT | Mod: HCNC,CPTII,S$GLB, | Performed by: PHYSICIAN ASSISTANT

## 2022-05-06 PROCEDURE — 99499 RISK ADDL DX/OHS AUDIT: ICD-10-PCS | Mod: HCNC,S$GLB,, | Performed by: PHYSICIAN ASSISTANT

## 2022-05-06 PROCEDURE — 1101F PT FALLS ASSESS-DOCD LE1/YR: CPT | Mod: HCNC,CPTII,S$GLB, | Performed by: PHYSICIAN ASSISTANT

## 2022-05-06 PROCEDURE — 99499 UNLISTED E&M SERVICE: CPT | Mod: HCNC,S$GLB,, | Performed by: PHYSICIAN ASSISTANT

## 2022-05-06 PROCEDURE — 1126F PR PAIN SEVERITY QUANTIFIED, NO PAIN PRESENT: ICD-10-PCS | Mod: HCNC,CPTII,S$GLB, | Performed by: PHYSICIAN ASSISTANT

## 2022-05-06 RX ORDER — ATORVASTATIN CALCIUM 40 MG/1
40 TABLET, FILM COATED ORAL DAILY
Qty: 90 TABLET | Refills: 3 | Status: SHIPPED | OUTPATIENT
Start: 2022-05-06 | End: 2022-05-06

## 2022-05-06 RX ORDER — CICLOPIROX OLAMINE 7.7 MG/G
CREAM TOPICAL 2 TIMES DAILY
Qty: 90 G | Refills: 3 | Status: SHIPPED | OUTPATIENT
Start: 2022-05-06

## 2022-05-06 RX ORDER — ATORVASTATIN CALCIUM 80 MG/1
80 TABLET, FILM COATED ORAL DAILY
Qty: 90 TABLET | Refills: 3 | Status: SHIPPED | OUTPATIENT
Start: 2022-05-06 | End: 2023-01-06 | Stop reason: SDUPTHER

## 2022-05-06 RX ORDER — METFORMIN HYDROCHLORIDE 1000 MG/1
1000 TABLET ORAL 2 TIMES DAILY
Qty: 180 TABLET | Refills: 3 | Status: SHIPPED | OUTPATIENT
Start: 2022-05-06 | End: 2022-12-30 | Stop reason: SDUPTHER

## 2022-05-06 NOTE — PROGRESS NOTES
"CC: This 67 y.o. male presents for management of Diabetes Mellitus and chronic conditions pending review including HTN, HLP     HPI: was diagnosed with T2DM in 2012.   Has never been hospitalized r/t DM.  Family hx of DM: sisters  Fhx of thyroid disease:n/a  Denies missing doses of DM medication.   hypoglycemia at home: none   Glucose is 81 in clinic. No n/v, tremors, dizziness.  Monitoring BG at home 1x daily:  Fastin    Diet: His diet has not changed.  BF-eggs, one pancake  LH-wings, potato salad  DN-cabbage and rice  Snacks on fruit.  Avoids sugary beverages.     Exercise: rides a stationary bike qod for 15 min, yard work.    CURRENT DM MEDS: metformin 2 g qd (sometimes only takes once), levemir 24 units qhs, Rybelsus 7 mg daily    Previous meds:  jardiance-too expensive  Glimepiride    Standards of Care:  Eye exam:  Dr. Vides  Podiatry exam: n/a  DE:     Taking vd daily.    PMHx, PSHx: reviewed in epic.  Social Hx: no E/T use. He has three children.    Wt Readings from Last 6 Encounters:   22 83.7 kg (184 lb 8.4 oz)   22 88 kg (194 lb)   21 88 kg (194 lb)   21 88.4 kg (194 lb 12.8 oz)   21 88.2 kg (194 lb 7.1 oz)   21 88 kg (194 lb 0.1 oz)       ROS:   Gen: Appetite good, + wt loss (7 lbs since ), denies fatigue and weakness.  Skin: Skin is intact and heals well, no rashes, no hair changes  Eyes: Denies visual disturbances  Resp: no SOB or PINO, no cough  Cardiac: No palpitations, chest pain, no edema   GI: No nausea or vomiting, diarrhea, constipation, or abdominal pain.  /GYN: + ED, No nocturia, burning or pain.   MS/Neuro: Denies numbness/ tingling in BLE; Gait steady, speech clear  Psych: Denies drug/ETOH abuse, no hx of depression.  Other systems: negative.    /80 (BP Location: Left arm, Patient Position: Sitting, BP Method: Small (Manual))   Pulse 87   Temp 98.3 °F (36.8 °C) (Oral)   Ht 5' 9" (1.753 m)   Wt 83.7 kg (184 lb 8.4 oz)   SpO2 " 97%   BMI 27.25 kg/m²      PE:  GENERAL: middle aged male, well developed, well nourished.  PSYCH: AAOx3, appropriate mood and affect, pleasant expression, conversant, appears relaxed, well groomed.   EYES: Conjunctiva, corneas clear  NECK: Supple, trachea midline,no thyromegaly or nodules  CHEST: Resp even and unlabored, CTA bilateral.  CARDIAC: RRR, S1, S2 heard, no murmurs  VASCULAR: DP pulses +2/4 bilaterally, no edema.  NEURO: Gait steady, CN ll-Xll grossly intact  SKIN: Skin warm and dry no acanthosis nigracans.  5/22  Foot Exam: no sores or macerations noted.     Protective Sensation (w/ 10 gram monofilament):  Right: Intact  Left: Intact    Visual Inspection:  Normal -  Bilateral and Nails Intact - without Evidence of Foot Deformity- Bilateral    Pedal Pulses:   Right: Present  Left: Present    Posterior tibialis:   Right:Present  Left: Present     Vibratory Sensation  Right:Positive  Left:Positive     Personally reviewed labs below:    Lab Visit on 04/29/2022   Component Date Value Ref Range Status    Cholesterol 04/29/2022 177  120 - 199 mg/dL Final    Comment: The National Cholesterol Education Program (NCEP) has set the  following guidelines (reference ranges) for Cholesterol:  Optimal.....................<200 mg/dL  Borderline High.............200-239 mg/dL  High........................> or = 240 mg/dL      Triglycerides 04/29/2022 55  30 - 150 mg/dL Final    Comment: The National Cholesterol Education Program (NCEP) has set the  following guidelines (reference values) for triglycerides:  Normal......................<150 mg/dL  Borderline High.............150-199 mg/dL  High........................200-499 mg/dL      HDL 04/29/2022 38 (A) 40 - 75 mg/dL Final    Comment: The National Cholesterol Education Program (NCEP) has set the  following guidelines (reference values) for HDL Cholesterol:  Low...............<40 mg/dL  Optimal...........>60 mg/dL      LDL Cholesterol 04/29/2022 128.0  63.0 - 159.0  mg/dL Final    Comment: The National Cholesterol Education Program (NCEP) has set the  following guidelines (reference values) for LDL Cholesterol:  Optimal.......................<130 mg/dL  Borderline High...............130-159 mg/dL  High..........................160-189 mg/dL  Very High.....................>190 mg/dL      HDL/Cholesterol Ratio 04/29/2022 21.5  20.0 - 50.0 % Final    Total Cholesterol/HDL Ratio 04/29/2022 4.7  2.0 - 5.0 Final    Non-HDL Cholesterol 04/29/2022 139  mg/dL Final    Comment: Risk category and Non-HDL cholesterol goals:  Coronary heart disease (CHD)or equivalent (10-year risk of CHD >20%):  Non-HDL cholesterol goal     <130 mg/dL  Two or more CHD risk factors and 10-year risk of CHD <= 20%:  Non-HDL cholesterol goal     <160 mg/dL  0 to 1 CHD risk factor:  Non-HDL cholesterol goal     <190 mg/dL      Sodium 04/29/2022 141  136 - 145 mmol/L Final    Potassium 04/29/2022 4.5  3.5 - 5.1 mmol/L Final    Chloride 04/29/2022 103  95 - 110 mmol/L Final    CO2 04/29/2022 28  23 - 29 mmol/L Final    Glucose 04/29/2022 94  70 - 110 mg/dL Final    BUN 04/29/2022 15  8 - 23 mg/dL Final    Creatinine 04/29/2022 0.9  0.5 - 1.4 mg/dL Final    Calcium 04/29/2022 9.8  8.7 - 10.5 mg/dL Final    Total Protein 04/29/2022 7.3  6.0 - 8.4 g/dL Final    Albumin 04/29/2022 3.9  3.5 - 5.2 g/dL Final    Total Bilirubin 04/29/2022 0.5  0.1 - 1.0 mg/dL Final    Comment: For infants and newborns, interpretation of results should be based  on gestational age, weight and in agreement with clinical  observations.    Premature Infant recommended reference ranges:  Up to 24 hours.............<8.0 mg/dL  Up to 48 hours............<12.0 mg/dL  3-5 days..................<15.0 mg/dL  6-29 days.................<15.0 mg/dL      Alkaline Phosphatase 04/29/2022 294 (A) 55 - 135 U/L Final    AST 04/29/2022 27  10 - 40 U/L Final    ALT 04/29/2022 27  10 - 44 U/L Final    Anion Gap 04/29/2022 10  8 - 16 mmol/L  Final    eGFR if African American 04/29/2022 >60.0  >60 mL/min/1.73 m^2 Final    eGFR if non African American 04/29/2022 >60.0  >60 mL/min/1.73 m^2 Final    Comment: Calculation used to obtain the estimated glomerular filtration  rate (eGFR) is the CKD-EPI equation.       Hemoglobin A1C 04/29/2022 5.9 (A) 4.0 - 5.6 % Final    Comment: ADA Screening Guidelines:  5.7-6.4%  Consistent with prediabetes  >or=6.5%  Consistent with diabetes    High levels of fetal hemoglobin interfere with the HbA1C  assay. Heterozygous hemoglobin variants (HbS, HgC, etc)do  not significantly interfere with this assay.   However, presence of multiple variants may affect accuracy.      Estimated Avg Glucose 04/29/2022 123  68 - 131 mg/dL Final    Free T4 04/29/2022 0.98  0.71 - 1.51 ng/dL Final    TSH 04/29/2022 1.162  0.400 - 4.000 uIU/mL Final         ASSESSMENT and PLAN:    1. Uncontrolled type 2 diabetes mellitus with diabetic nephropathy, with long-term current use of insulin  Fructosamine    Hemoglobin A1C    Iron and TIBC    Renal Function Panel    POCT Glucose, Hand-Held Device    metFORMIN (GLUCOPHAGE) 1000 MG tablet   2. Hyperlipidemia, unspecified hyperlipidemia type  atorvastatin (LIPITOR) 40 MG tablet    Lipid Panel    Comprehensive Metabolic Panel   3. Obesity (BMI 30-39.9)     4. Erectile dysfunction, unspecified erectile dysfunction type     5. Onychomycosis  ciclopirox (LOPROX) 0.77 % Crea   6. Hypovitaminosis D  Vitamin D      T2DM with hyperglycemia-A1c is in the desired range, slightly low for age. Decrease levemir to 20 units nightly. Continue Rybelsus and Metformin. Discussed DM, progression of disease, long term complications, tx options. Check glucose 2x daily.  Discussed A1c and BG goals.   Reviewed  hypoglycemia, s/s and appropriate tx.   Instructed to monitor BG and bring meter/ log to every clinic visit.   - takes ASA, statin    HLP -elevated LDL , on statin therapy, LFTs WNL.  Increase Lipitor to 80 mg  daily.  Obesity- Body mass index is 27.25 kg/m². Increase exercise to 30 min daily. Continue wt loss.   Erectile dysfunction-testosterone wnl.  Onychomycosis-start ciclopirox bid.    Follow-up: in 4 months with lab prior -

## 2022-05-06 NOTE — PATIENT INSTRUCTIONS
Decrease Levemir to 20 units nightly. Continue Rybelsus and Metformin. Increase Lipitor to 80 mg daily.

## 2022-08-10 LAB
LEFT EYE DM RETINOPATHY: NEGATIVE
RIGHT EYE DM RETINOPATHY: NEGATIVE

## 2022-09-07 ENCOUNTER — OFFICE VISIT (OUTPATIENT)
Dept: ENDOCRINOLOGY | Facility: CLINIC | Age: 67
End: 2022-09-07
Payer: COMMERCIAL

## 2022-09-07 VITALS
BODY MASS INDEX: 28.8 KG/M2 | WEIGHT: 194.44 LBS | OXYGEN SATURATION: 98 % | HEART RATE: 81 BPM | HEIGHT: 69 IN | SYSTOLIC BLOOD PRESSURE: 130 MMHG | TEMPERATURE: 98 F | DIASTOLIC BLOOD PRESSURE: 78 MMHG

## 2022-09-07 DIAGNOSIS — B35.1 ONYCHOMYCOSIS: ICD-10-CM

## 2022-09-07 DIAGNOSIS — E53.8 VITAMIN B12 DEFICIENCY: ICD-10-CM

## 2022-09-07 DIAGNOSIS — E66.9 OBESITY (BMI 30-39.9): ICD-10-CM

## 2022-09-07 DIAGNOSIS — E78.5 HYPERLIPIDEMIA, UNSPECIFIED HYPERLIPIDEMIA TYPE: ICD-10-CM

## 2022-09-07 DIAGNOSIS — N52.9 ERECTILE DYSFUNCTION, UNSPECIFIED ERECTILE DYSFUNCTION TYPE: ICD-10-CM

## 2022-09-07 PROCEDURE — 3075F PR MOST RECENT SYSTOLIC BLOOD PRESS GE 130-139MM HG: ICD-10-PCS | Mod: HCNC,CPTII,S$GLB, | Performed by: PHYSICIAN ASSISTANT

## 2022-09-07 PROCEDURE — 1101F PT FALLS ASSESS-DOCD LE1/YR: CPT | Mod: HCNC,CPTII,S$GLB, | Performed by: PHYSICIAN ASSISTANT

## 2022-09-07 PROCEDURE — 1126F PR PAIN SEVERITY QUANTIFIED, NO PAIN PRESENT: ICD-10-PCS | Mod: HCNC,CPTII,S$GLB, | Performed by: PHYSICIAN ASSISTANT

## 2022-09-07 PROCEDURE — 99999 PR PBB SHADOW E&M-EST. PATIENT-LVL V: ICD-10-PCS | Mod: PBBFAC,HCNC,, | Performed by: PHYSICIAN ASSISTANT

## 2022-09-07 PROCEDURE — 3078F DIAST BP <80 MM HG: CPT | Mod: HCNC,CPTII,S$GLB, | Performed by: PHYSICIAN ASSISTANT

## 2022-09-07 PROCEDURE — 3072F PR LOW RISK FOR RETINOPATHY: ICD-10-PCS | Mod: HCNC,CPTII,S$GLB, | Performed by: PHYSICIAN ASSISTANT

## 2022-09-07 PROCEDURE — 1101F PR PT FALLS ASSESS DOC 0-1 FALLS W/OUT INJ PAST YR: ICD-10-PCS | Mod: HCNC,CPTII,S$GLB, | Performed by: PHYSICIAN ASSISTANT

## 2022-09-07 PROCEDURE — 99999 PR PBB SHADOW E&M-EST. PATIENT-LVL V: CPT | Mod: PBBFAC,HCNC,, | Performed by: PHYSICIAN ASSISTANT

## 2022-09-07 PROCEDURE — 3044F HG A1C LEVEL LT 7.0%: CPT | Mod: HCNC,CPTII,S$GLB, | Performed by: PHYSICIAN ASSISTANT

## 2022-09-07 PROCEDURE — 3044F PR MOST RECENT HEMOGLOBIN A1C LEVEL <7.0%: ICD-10-PCS | Mod: HCNC,CPTII,S$GLB, | Performed by: PHYSICIAN ASSISTANT

## 2022-09-07 PROCEDURE — 99213 PR OFFICE/OUTPT VISIT, EST, LEVL III, 20-29 MIN: ICD-10-PCS | Mod: HCNC,S$GLB,, | Performed by: PHYSICIAN ASSISTANT

## 2022-09-07 PROCEDURE — 3075F SYST BP GE 130 - 139MM HG: CPT | Mod: HCNC,CPTII,S$GLB, | Performed by: PHYSICIAN ASSISTANT

## 2022-09-07 PROCEDURE — 3288F FALL RISK ASSESSMENT DOCD: CPT | Mod: HCNC,CPTII,S$GLB, | Performed by: PHYSICIAN ASSISTANT

## 2022-09-07 PROCEDURE — 1126F AMNT PAIN NOTED NONE PRSNT: CPT | Mod: HCNC,CPTII,S$GLB, | Performed by: PHYSICIAN ASSISTANT

## 2022-09-07 PROCEDURE — 99213 OFFICE O/P EST LOW 20 MIN: CPT | Mod: HCNC,S$GLB,, | Performed by: PHYSICIAN ASSISTANT

## 2022-09-07 PROCEDURE — 1159F MED LIST DOCD IN RCRD: CPT | Mod: HCNC,CPTII,S$GLB, | Performed by: PHYSICIAN ASSISTANT

## 2022-09-07 PROCEDURE — 3078F PR MOST RECENT DIASTOLIC BLOOD PRESSURE < 80 MM HG: ICD-10-PCS | Mod: HCNC,CPTII,S$GLB, | Performed by: PHYSICIAN ASSISTANT

## 2022-09-07 PROCEDURE — 1160F RVW MEDS BY RX/DR IN RCRD: CPT | Mod: HCNC,CPTII,S$GLB, | Performed by: PHYSICIAN ASSISTANT

## 2022-09-07 PROCEDURE — 1160F PR REVIEW ALL MEDS BY PRESCRIBER/CLIN PHARMACIST DOCUMENTED: ICD-10-PCS | Mod: HCNC,CPTII,S$GLB, | Performed by: PHYSICIAN ASSISTANT

## 2022-09-07 PROCEDURE — 3288F PR FALLS RISK ASSESSMENT DOCUMENTED: ICD-10-PCS | Mod: HCNC,CPTII,S$GLB, | Performed by: PHYSICIAN ASSISTANT

## 2022-09-07 PROCEDURE — 1159F PR MEDICATION LIST DOCUMENTED IN MEDICAL RECORD: ICD-10-PCS | Mod: HCNC,CPTII,S$GLB, | Performed by: PHYSICIAN ASSISTANT

## 2022-09-07 PROCEDURE — 3008F PR BODY MASS INDEX (BMI) DOCUMENTED: ICD-10-PCS | Mod: HCNC,CPTII,S$GLB, | Performed by: PHYSICIAN ASSISTANT

## 2022-09-07 PROCEDURE — 3008F BODY MASS INDEX DOCD: CPT | Mod: HCNC,CPTII,S$GLB, | Performed by: PHYSICIAN ASSISTANT

## 2022-09-07 PROCEDURE — 3072F LOW RISK FOR RETINOPATHY: CPT | Mod: HCNC,CPTII,S$GLB, | Performed by: PHYSICIAN ASSISTANT

## 2022-09-07 NOTE — PROGRESS NOTES
"CC: This 67 y.o. male presents for management of Diabetes Mellitus and chronic conditions pending review including HTN, HLP     HPI: was diagnosed with T2DM in .   Has never been hospitalized r/t DM.  Family hx of DM: sisters  Fhx of thyroid disease:n/a  Denies missing doses of DM medication.   hypoglycemia at home: none  Monitoring BG at home 1x daily:  Fastin-120      Diet:   BF-eggs, toast, grits  LH-pints  DN-pasta  Snacks on fruit.  Drinks coke 2x weekly.     Exercise: Plays basketball on  and volleyball on , yard work.    CURRENT DM MEDS: metformin 2 g qd (sometimes only takes once), levemir 20 units qhs, Rybelsus 7 mg daily    Previous meds:  jardiance-too expensive  Glimepiride    Standards of Care:  Eye exam:    Podiatry exam: n/a  DE:     Taking vd daily.    PMHx, PSHx: reviewed in epic.  Social Hx: no E/T use. He has three children.    Wt Readings from Last 10 Encounters:   22 88.2 kg (194 lb 7.1 oz)   22 83.7 kg (184 lb 8.4 oz)   22 88 kg (194 lb)   21 88 kg (194 lb)   21 88.4 kg (194 lb 12.8 oz)   21 88.2 kg (194 lb 7.1 oz)   21 88 kg (194 lb 0.1 oz)   21 87.9 kg (193 lb 12.6 oz)   21 91.1 kg (200 lb 13.4 oz)   02/10/21 91.7 kg (202 lb 2.6 oz)       ROS:   Gen: Appetite good, + wt gain (10 lbs since ), denies fatigue and weakness.  Skin: Skin is intact and heals well, no rashes, no hair changes  Eyes: Denies visual disturbances  Resp: no SOB or PINO, no cough  Cardiac: No palpitations, chest pain, no edema   GI: No nausea or vomiting, diarrhea, constipation, or abdominal pain.  /GYN: + ED, No nocturia, burning or pain.   MS/Neuro: Denies numbness/ tingling in BLE; Gait steady, speech clear  Psych: Denies drug/ETOH abuse, no hx of depression.  Other systems: negative.    /78 (BP Location: Left arm, Patient Position: Sitting, BP Method: Small (Manual))   Pulse 81   Temp 98.4 °F (36.9 °C) (Oral)   Ht 5' 9" " (1.753 m)   Wt 88.2 kg (194 lb 7.1 oz)   SpO2 98%   BMI 28.71 kg/m²      PE:  GENERAL: middle aged male, well developed, well nourished.  PSYCH: AAOx3, appropriate mood and affect, pleasant expression, conversant, appears relaxed, well groomed.   EYES: Conjunctiva, corneas clear  NECK: Supple, trachea midline,no thyromegaly or nodules  CHEST: Resp even and unlabored, CTA bilateral.  CARDIAC: RRR, S1, S2 heard, no murmurs  VASCULAR: DP pulses +2/4 bilaterally, no edema.  NEURO: Gait steady, CN ll-Xll grossly intact  SKIN: Skin warm and dry no acanthosis nigracans.  5/22  Foot Exam: no sores or macerations noted.     Protective Sensation (w/ 10 gram monofilament):  Right: Intact  Left: Intact    Visual Inspection:  Normal -  Bilateral and Nails Intact - without Evidence of Foot Deformity- Bilateral    Pedal Pulses:   Right: Present  Left: Present    Posterior tibialis:   Right:Present  Left: Present     Vibratory Sensation  Right:Positive  Left:Positive     Personally reviewed labs below:    No visits with results within 1 Month(s) from this visit.   Latest known visit with results is:   Office Visit on 05/06/2022   Component Date Value Ref Range Status    POC Glucose 05/06/2022 81  70 - 110 MG/DL Final         ASSESSMENT and PLAN:    1. Uncontrolled type 2 diabetes mellitus with diabetic nephropathy, with long-term current use of insulin  Hemoglobin A1C      2. Hyperlipidemia, unspecified hyperlipidemia type        3. Obesity (BMI 30-39.9)        4. Erectile dysfunction, unspecified erectile dysfunction type        5. Onychomycosis        6. Vitamin B12 deficiency  Vitamin B12    ZINC         T2DM with hyperglycemia-A1c is in the desired range, slightly low for age. Decrease levemir to 20 units nightly. Continue Rybelsus and Metformin. Discussed DM, progression of disease, long term complications, tx options. Check glucose 2x daily.  Discussed A1c and BG goals.   Reviewed  hypoglycemia, s/s and appropriate tx.    Instructed to monitor BG and bring meter/ log to every clinic visit.   - takes ASA, statin    HLP -elevated LDL , on statin therapy, LFTs WNL.  Increase Lipitor to 80 mg daily.  Obesity- Body mass index is 28.71 kg/m². Increase exercise to 30 min daily. Continue wt loss.   Erectile dysfunction-testosterone wnl.  Onychomycosis-start ciclopirox bid.    Fasting labs  Follow-up: in 4 months with lab prior -a1c, rp

## 2022-09-12 ENCOUNTER — LAB VISIT (OUTPATIENT)
Dept: LAB | Facility: HOSPITAL | Age: 67
End: 2022-09-12
Attending: PHYSICIAN ASSISTANT
Payer: MEDICARE

## 2022-09-12 DIAGNOSIS — E55.9 HYPOVITAMINOSIS D: ICD-10-CM

## 2022-09-12 DIAGNOSIS — E78.5 HYPERLIPIDEMIA, UNSPECIFIED HYPERLIPIDEMIA TYPE: ICD-10-CM

## 2022-09-12 DIAGNOSIS — E53.8 VITAMIN B12 DEFICIENCY: ICD-10-CM

## 2022-09-12 LAB
25(OH)D3+25(OH)D2 SERPL-MCNC: 30 NG/ML (ref 30–96)
ALBUMIN SERPL BCP-MCNC: 4 G/DL (ref 3.5–5.2)
ALP SERPL-CCNC: 327 U/L (ref 55–135)
ALT SERPL W/O P-5'-P-CCNC: 27 U/L (ref 10–44)
ANION GAP SERPL CALC-SCNC: 5 MMOL/L (ref 8–16)
AST SERPL-CCNC: 24 U/L (ref 10–40)
BILIRUB SERPL-MCNC: 0.5 MG/DL (ref 0.1–1)
BUN SERPL-MCNC: 19 MG/DL (ref 8–23)
CALCIUM SERPL-MCNC: 9.5 MG/DL (ref 8.7–10.5)
CHLORIDE SERPL-SCNC: 105 MMOL/L (ref 95–110)
CHOLEST SERPL-MCNC: 141 MG/DL (ref 120–199)
CHOLEST/HDLC SERPL: 3.6 {RATIO} (ref 2–5)
CO2 SERPL-SCNC: 27 MMOL/L (ref 23–29)
CREAT SERPL-MCNC: 1 MG/DL (ref 0.5–1.4)
EST. GFR  (NO RACE VARIABLE): >60 ML/MIN/1.73 M^2
ESTIMATED AVG GLUCOSE: 126 MG/DL (ref 68–131)
GLUCOSE SERPL-MCNC: 119 MG/DL (ref 70–110)
HBA1C MFR BLD: 6 % (ref 4–5.6)
HDLC SERPL-MCNC: 39 MG/DL (ref 40–75)
HDLC SERPL: 27.7 % (ref 20–50)
IRON SERPL-MCNC: 95 UG/DL (ref 45–160)
LDLC SERPL CALC-MCNC: 93 MG/DL (ref 63–159)
NONHDLC SERPL-MCNC: 102 MG/DL
POTASSIUM SERPL-SCNC: 4.4 MMOL/L (ref 3.5–5.1)
PROT SERPL-MCNC: 7.3 G/DL (ref 6–8.4)
SATURATED IRON: 28 % (ref 20–50)
SODIUM SERPL-SCNC: 137 MMOL/L (ref 136–145)
TOTAL IRON BINDING CAPACITY: 345 UG/DL (ref 250–450)
TRANSFERRIN SERPL-MCNC: 233 MG/DL (ref 200–375)
TRIGL SERPL-MCNC: 45 MG/DL (ref 30–150)
VIT B12 SERPL-MCNC: 739 PG/ML (ref 210–950)

## 2022-09-12 PROCEDURE — 82985 ASSAY OF GLYCATED PROTEIN: CPT | Mod: HCNC | Performed by: PHYSICIAN ASSISTANT

## 2022-09-12 PROCEDURE — 80061 LIPID PANEL: CPT | Mod: HCNC | Performed by: PHYSICIAN ASSISTANT

## 2022-09-12 PROCEDURE — 80053 COMPREHEN METABOLIC PANEL: CPT | Mod: HCNC | Performed by: PHYSICIAN ASSISTANT

## 2022-09-12 PROCEDURE — 83036 HEMOGLOBIN GLYCOSYLATED A1C: CPT | Mod: HCNC | Performed by: PHYSICIAN ASSISTANT

## 2022-09-12 PROCEDURE — 84466 ASSAY OF TRANSFERRIN: CPT | Mod: HCNC | Performed by: PHYSICIAN ASSISTANT

## 2022-09-12 PROCEDURE — 82607 VITAMIN B-12: CPT | Mod: HCNC | Performed by: PHYSICIAN ASSISTANT

## 2022-09-12 PROCEDURE — 36415 COLL VENOUS BLD VENIPUNCTURE: CPT | Mod: HCNC,PO | Performed by: PHYSICIAN ASSISTANT

## 2022-09-12 PROCEDURE — 82306 VITAMIN D 25 HYDROXY: CPT | Mod: HCNC | Performed by: PHYSICIAN ASSISTANT

## 2022-09-12 PROCEDURE — 84630 ASSAY OF ZINC: CPT | Mod: HCNC | Performed by: PHYSICIAN ASSISTANT

## 2022-09-15 LAB
FRUCTOSAMINE SERPL-SCNC: 265 UMOL /L
ZINC SERPL-MCNC: 81 UG/DL (ref 60–130)

## 2022-11-09 ENCOUNTER — TELEPHONE (OUTPATIENT)
Dept: FAMILY MEDICINE | Facility: CLINIC | Age: 67
End: 2022-11-09
Payer: MEDICARE

## 2022-11-09 NOTE — TELEPHONE ENCOUNTER
----- Message from Daylin Persaud sent at 11/9/2022  4:09 PM CST -----  Contact: Patient  Type:  RX Refill Request    Who Called: Patient     Refill or New Rx: refill    RX Name and Strength: diclofenac sodium (VOLTAREN) 1 % Gel    How is the patient currently taking it? (ex. 1XDay)    Is this a 30 day or 90 day RX:    Preferred Pharmacy with phone number:   [facility] [id number] [place], [place] - [address] [address]  Phone: [phone] Fax: [phone]    Local or Mail Order:    Ordering Provider:    Would the patient rather a call back or a response via [facility]?    Best Call Back Number: [phone]    Additional Information: requesting a callback once called in

## 2022-11-09 NOTE — TELEPHONE ENCOUNTER
Called patient in regards to needing a medication refill. Patient needs to be seen in clinic for a refill.

## 2022-11-10 NOTE — TELEPHONE ENCOUNTER
----- Message from Daylin Persaud sent at 11/9/2022  4:09 PM CST -----  Contact: Patient  Type:  RX Refill Request    Who Called: Patient     Refill or New Rx: refill    RX Name and Strength: diclofenac sodium (VOLTAREN) 1 % Gel    How is the patient currently taking it? (ex. 1XDay)    Is this a 30 day or 90 day RX:    Preferred Pharmacy with phone number:   [facility] [address] [place], [place] - [address] [address]  Phone: [phone] Fax: [phone]    Local or Mail Order:    Ordering Provider:    Would the patient rather a call back or a response via [facility]?    Best Call Back Number: [phone]    Additional Information: requesting a callback once called in

## 2022-12-29 ENCOUNTER — TELEPHONE (OUTPATIENT)
Dept: FAMILY MEDICINE | Facility: CLINIC | Age: 67
End: 2022-12-29
Payer: MEDICARE

## 2022-12-29 NOTE — TELEPHONE ENCOUNTER
Returned patients call in regards to questions concerning medication. No answer , left voicemail to return call.

## 2022-12-29 NOTE — TELEPHONE ENCOUNTER
----- Message from Elina Cuellar sent at 12/29/2022  9:32 AM CST -----  Type: Needs Medical Advice  Who Called:  pt  Symptoms (please be specific):  pt said he need to speak to the nurseabout his meds--please call and advise  Best Call Back Number: [phone] (home)     Additional Information: thank you

## 2022-12-30 ENCOUNTER — LAB VISIT (OUTPATIENT)
Dept: LAB | Facility: HOSPITAL | Age: 67
End: 2022-12-30
Attending: PHYSICIAN ASSISTANT
Payer: MEDICARE

## 2022-12-30 LAB
ALBUMIN SERPL BCP-MCNC: 3.7 G/DL (ref 3.5–5.2)
ANION GAP SERPL CALC-SCNC: 9 MMOL/L (ref 8–16)
BUN SERPL-MCNC: 15 MG/DL (ref 8–23)
CALCIUM SERPL-MCNC: 9.2 MG/DL (ref 8.7–10.5)
CHLORIDE SERPL-SCNC: 103 MMOL/L (ref 95–110)
CO2 SERPL-SCNC: 26 MMOL/L (ref 23–29)
CREAT SERPL-MCNC: 1.1 MG/DL (ref 0.5–1.4)
EST. GFR  (NO RACE VARIABLE): >60 ML/MIN/1.73 M^2
ESTIMATED AVG GLUCOSE: 151 MG/DL (ref 68–131)
GLUCOSE SERPL-MCNC: 211 MG/DL (ref 70–110)
HBA1C MFR BLD: 6.9 % (ref 4–5.6)
PHOSPHATE SERPL-MCNC: 2.8 MG/DL (ref 2.7–4.5)
POTASSIUM SERPL-SCNC: 4 MMOL/L (ref 3.5–5.1)
SODIUM SERPL-SCNC: 138 MMOL/L (ref 136–145)

## 2022-12-30 PROCEDURE — 83036 HEMOGLOBIN GLYCOSYLATED A1C: CPT | Mod: HCNC | Performed by: PHYSICIAN ASSISTANT

## 2022-12-30 PROCEDURE — 80069 RENAL FUNCTION PANEL: CPT | Mod: HCNC | Performed by: PHYSICIAN ASSISTANT

## 2022-12-30 PROCEDURE — 36415 COLL VENOUS BLD VENIPUNCTURE: CPT | Mod: HCNC,PO | Performed by: PHYSICIAN ASSISTANT

## 2023-01-06 ENCOUNTER — OFFICE VISIT (OUTPATIENT)
Dept: ENDOCRINOLOGY | Facility: CLINIC | Age: 68
End: 2023-01-06
Payer: MEDICARE

## 2023-01-06 VITALS
DIASTOLIC BLOOD PRESSURE: 80 MMHG | TEMPERATURE: 98 F | WEIGHT: 201.38 LBS | HEART RATE: 92 BPM | SYSTOLIC BLOOD PRESSURE: 120 MMHG | OXYGEN SATURATION: 98 % | HEIGHT: 69 IN | BODY MASS INDEX: 29.83 KG/M2

## 2023-01-06 DIAGNOSIS — E78.5 HYPERLIPIDEMIA, UNSPECIFIED HYPERLIPIDEMIA TYPE: ICD-10-CM

## 2023-01-06 DIAGNOSIS — E11.65 TYPE 2 DIABETES MELLITUS WITH HYPERGLYCEMIA, WITHOUT LONG-TERM CURRENT USE OF INSULIN: Primary | ICD-10-CM

## 2023-01-06 PROCEDURE — 1159F MED LIST DOCD IN RCRD: CPT | Mod: HCNC,CPTII,S$GLB, | Performed by: PHYSICIAN ASSISTANT

## 2023-01-06 PROCEDURE — 3074F SYST BP LT 130 MM HG: CPT | Mod: HCNC,CPTII,S$GLB, | Performed by: PHYSICIAN ASSISTANT

## 2023-01-06 PROCEDURE — 1126F AMNT PAIN NOTED NONE PRSNT: CPT | Mod: HCNC,CPTII,S$GLB, | Performed by: PHYSICIAN ASSISTANT

## 2023-01-06 PROCEDURE — 1126F PR PAIN SEVERITY QUANTIFIED, NO PAIN PRESENT: ICD-10-PCS | Mod: HCNC,CPTII,S$GLB, | Performed by: PHYSICIAN ASSISTANT

## 2023-01-06 PROCEDURE — 3288F FALL RISK ASSESSMENT DOCD: CPT | Mod: HCNC,CPTII,S$GLB, | Performed by: PHYSICIAN ASSISTANT

## 2023-01-06 PROCEDURE — 3079F PR MOST RECENT DIASTOLIC BLOOD PRESSURE 80-89 MM HG: ICD-10-PCS | Mod: HCNC,CPTII,S$GLB, | Performed by: PHYSICIAN ASSISTANT

## 2023-01-06 PROCEDURE — 1101F PT FALLS ASSESS-DOCD LE1/YR: CPT | Mod: HCNC,CPTII,S$GLB, | Performed by: PHYSICIAN ASSISTANT

## 2023-01-06 PROCEDURE — 3074F PR MOST RECENT SYSTOLIC BLOOD PRESSURE < 130 MM HG: ICD-10-PCS | Mod: HCNC,CPTII,S$GLB, | Performed by: PHYSICIAN ASSISTANT

## 2023-01-06 PROCEDURE — 3079F DIAST BP 80-89 MM HG: CPT | Mod: HCNC,CPTII,S$GLB, | Performed by: PHYSICIAN ASSISTANT

## 2023-01-06 PROCEDURE — 3288F PR FALLS RISK ASSESSMENT DOCUMENTED: ICD-10-PCS | Mod: HCNC,CPTII,S$GLB, | Performed by: PHYSICIAN ASSISTANT

## 2023-01-06 PROCEDURE — 99213 PR OFFICE/OUTPT VISIT, EST, LEVL III, 20-29 MIN: ICD-10-PCS | Mod: HCNC,S$GLB,, | Performed by: PHYSICIAN ASSISTANT

## 2023-01-06 PROCEDURE — 99213 OFFICE O/P EST LOW 20 MIN: CPT | Mod: HCNC,S$GLB,, | Performed by: PHYSICIAN ASSISTANT

## 2023-01-06 PROCEDURE — 1101F PR PT FALLS ASSESS DOC 0-1 FALLS W/OUT INJ PAST YR: ICD-10-PCS | Mod: HCNC,CPTII,S$GLB, | Performed by: PHYSICIAN ASSISTANT

## 2023-01-06 PROCEDURE — 3008F PR BODY MASS INDEX (BMI) DOCUMENTED: ICD-10-PCS | Mod: HCNC,CPTII,S$GLB, | Performed by: PHYSICIAN ASSISTANT

## 2023-01-06 PROCEDURE — 99999 PR PBB SHADOW E&M-EST. PATIENT-LVL V: CPT | Mod: PBBFAC,HCNC,, | Performed by: PHYSICIAN ASSISTANT

## 2023-01-06 PROCEDURE — 1159F PR MEDICATION LIST DOCUMENTED IN MEDICAL RECORD: ICD-10-PCS | Mod: HCNC,CPTII,S$GLB, | Performed by: PHYSICIAN ASSISTANT

## 2023-01-06 PROCEDURE — 1160F RVW MEDS BY RX/DR IN RCRD: CPT | Mod: HCNC,CPTII,S$GLB, | Performed by: PHYSICIAN ASSISTANT

## 2023-01-06 PROCEDURE — 99999 PR PBB SHADOW E&M-EST. PATIENT-LVL V: ICD-10-PCS | Mod: PBBFAC,HCNC,, | Performed by: PHYSICIAN ASSISTANT

## 2023-01-06 PROCEDURE — 1160F PR REVIEW ALL MEDS BY PRESCRIBER/CLIN PHARMACIST DOCUMENTED: ICD-10-PCS | Mod: HCNC,CPTII,S$GLB, | Performed by: PHYSICIAN ASSISTANT

## 2023-01-06 PROCEDURE — 3008F BODY MASS INDEX DOCD: CPT | Mod: HCNC,CPTII,S$GLB, | Performed by: PHYSICIAN ASSISTANT

## 2023-01-06 RX ORDER — ATORVASTATIN CALCIUM 80 MG/1
80 TABLET, FILM COATED ORAL DAILY
Qty: 90 TABLET | Refills: 3 | Status: SHIPPED | OUTPATIENT
Start: 2023-01-06 | End: 2024-02-19

## 2023-01-06 NOTE — PROGRESS NOTES
"CC: This 67 y.o. male presents for management of Diabetes Mellitus and chronic conditions pending review including HTN, HLP     HPI: was diagnosed with T2DM in .   Has never been hospitalized r/t DM.  Family hx of DM: sisters  Fhx of thyroid disease:n/a  Denies missing doses of DM medication.   hypoglycemia at home: none  Monitoring BG at home 1x daily:  Fastins    Diet:   BF-eggs,  grits  LH-black eyed peas  DN-chicken noodle soup  Snacks on fruit.  Drinks coke 2x weekly.     Exercise: Plays basketball on  and volleyball on , yard work.    CURRENT DM MEDS: metformin 2 g qd bid, Levemir 20 units qhs, Rybelsus 7 mg daily    Previous meds:  jardiance-too expensive  Glimepiride    Standards of Care:  Eye exam:    Podiatry exam: n/a  DE:     Taking vd daily.    PMHx, PSHx: reviewed in epic.  Social Hx: no E/T use. He has three children.    Wt Readings from Last 10 Encounters:   23 91.3 kg (201 lb 6.2 oz)   22 88.2 kg (194 lb 7.1 oz)   22 83.7 kg (184 lb 8.4 oz)   22 88 kg (194 lb)   21 88 kg (194 lb)   21 88.4 kg (194 lb 12.8 oz)   21 88.2 kg (194 lb 7.1 oz)   21 88 kg (194 lb 0.1 oz)   21 87.9 kg (193 lb 12.6 oz)   21 91.1 kg (200 lb 13.4 oz)       ROS:   Gen: Appetite good, + wt gain (10 lbs since ), denies fatigue and weakness.  Skin: Skin is intact and heals well, no rashes, no hair changes  Eyes: Denies visual disturbances  Resp: no SOB or PINO, no cough  Cardiac: No palpitations, chest pain, no edema   GI: No nausea or vomiting, diarrhea, constipation, or abdominal pain.  /GYN: + ED, No nocturia, burning or pain.   MS/Neuro: Denies numbness/ tingling in BLE; Gait steady, speech clear  Psych: Denies drug/ETOH abuse, no hx of depression.  Other systems: negative.    /80 (BP Location: Left arm, Patient Position: Sitting, BP Method: Small (Manual))   Pulse 92   Temp 98.3 °F (36.8 °C) (Oral)   Ht 5' 9" (1.753 m)  "  Wt 91.3 kg (201 lb 6.2 oz)   SpO2 98%   BMI 29.74 kg/m²      PE:  GENERAL: middle aged male, well developed, well nourished.  PSYCH: AAOx3, appropriate mood and affect, pleasant expression, conversant, appears relaxed, well groomed.   EYES: Conjunctiva, corneas clear  NECK: Supple, trachea midline,no thyromegaly or nodules  CHEST: Resp even and unlabored, CTA bilateral.  CARDIAC: RRR, S1, S2 heard, no murmurs  VASCULAR: DP pulses +2/4 bilaterally, no edema.  NEURO: Gait steady, CN ll-Xll grossly intact  SKIN: Skin warm and dry no acanthosis nigracans.  5/22  Foot Exam: no sores or macerations noted.     Protective Sensation (w/ 10 gram monofilament):  Right: Intact  Left: Intact    Visual Inspection:  Normal -  Bilateral and Nails Intact - without Evidence of Foot Deformity- Bilateral    Pedal Pulses:   Right: Present  Left: Present    Posterior tibialis:   Right:Present  Left: Present     Vibratory Sensation  Right:Positive  Left:Positive     Personally reviewed labs below:    Lab Visit on 12/30/2022   Component Date Value Ref Range Status    Glucose 12/30/2022 211 (H)  70 - 110 mg/dL Final    Sodium 12/30/2022 138  136 - 145 mmol/L Final    Potassium 12/30/2022 4.0  3.5 - 5.1 mmol/L Final    Chloride 12/30/2022 103  95 - 110 mmol/L Final    CO2 12/30/2022 26  23 - 29 mmol/L Final    BUN 12/30/2022 15  8 - 23 mg/dL Final    Calcium 12/30/2022 9.2  8.7 - 10.5 mg/dL Final    Creatinine 12/30/2022 1.1  0.5 - 1.4 mg/dL Final    Albumin 12/30/2022 3.7  3.5 - 5.2 g/dL Final    Phosphorus 12/30/2022 2.8  2.7 - 4.5 mg/dL Final    eGFR 12/30/2022 >60.0  >60 mL/min/1.73 m^2 Final    Anion Gap 12/30/2022 9  8 - 16 mmol/L Final    Hemoglobin A1C 12/30/2022 6.9 (H)  4.0 - 5.6 % Final    Comment: ADA Screening Guidelines:  5.7-6.4%  Consistent with prediabetes  >or=6.5%  Consistent with diabetes    High levels of fetal hemoglobin interfere with the HbA1C  assay. Heterozygous hemoglobin variants (HbS, HgC, etc)do  not  significantly interfere with this assay.   However, presence of multiple variants may affect accuracy.      Estimated Avg Glucose 12/30/2022 151 (H)  68 - 131 mg/dL Final         ASSESSMENT and PLAN:    1. Type 2 diabetes mellitus with hyperglycemia, without long-term current use of insulin  Hemoglobin A1C    CANCELED: Ambulatory referral/consult to Diabetes Education           T2DM with hyperglycemia-A1c is below goal. Continue Rybelsus, Metformin and Levemir. Discussed DM, progression of disease, long term complications, tx options. Check glucose 2x daily.  Discussed A1c and BG goals.   Reviewed  hypoglycemia, s/s and appropriate tx.   Instructed to monitor BG and bring meter/ log to every clinic visit.   - takes ASA, statin    HLP -stable LDL , on statin therapy, LFTs WNL.    Obesity- Body mass index is 29.74 kg/m². Increase exercise to 30 min daily. Continue wt loss.   Erectile dysfunction-testosterone wnl.  Onychomycosis-continue ciclopirox bid.      Follow-up: in 4 months with lab prior -a1c

## 2023-02-02 ENCOUNTER — OFFICE VISIT (OUTPATIENT)
Dept: FAMILY MEDICINE | Facility: CLINIC | Age: 68
End: 2023-02-02
Payer: MEDICARE

## 2023-02-02 VITALS
WEIGHT: 197.75 LBS | SYSTOLIC BLOOD PRESSURE: 110 MMHG | BODY MASS INDEX: 29.29 KG/M2 | HEIGHT: 69 IN | HEART RATE: 73 BPM | TEMPERATURE: 98 F | OXYGEN SATURATION: 98 % | DIASTOLIC BLOOD PRESSURE: 60 MMHG

## 2023-02-02 DIAGNOSIS — N52.9 VASCULOGENIC ERECTILE DYSFUNCTION, UNSPECIFIED VASCULOGENIC ERECTILE DYSFUNCTION TYPE: ICD-10-CM

## 2023-02-02 DIAGNOSIS — E11.59 CONTROLLED TYPE 2 DIABETES MELLITUS WITH OTHER CIRCULATORY COMPLICATION, WITHOUT LONG-TERM CURRENT USE OF INSULIN: Primary | ICD-10-CM

## 2023-02-02 DIAGNOSIS — R53.83 FATIGUE, UNSPECIFIED TYPE: ICD-10-CM

## 2023-02-02 PROCEDURE — 3074F SYST BP LT 130 MM HG: CPT | Mod: HCNC,CPTII,S$GLB, | Performed by: NURSE PRACTITIONER

## 2023-02-02 PROCEDURE — 99999 PR PBB SHADOW E&M-EST. PATIENT-LVL V: CPT | Mod: PBBFAC,HCNC,, | Performed by: NURSE PRACTITIONER

## 2023-02-02 PROCEDURE — 1126F PR PAIN SEVERITY QUANTIFIED, NO PAIN PRESENT: ICD-10-PCS | Mod: HCNC,CPTII,S$GLB, | Performed by: NURSE PRACTITIONER

## 2023-02-02 PROCEDURE — 3078F PR MOST RECENT DIASTOLIC BLOOD PRESSURE < 80 MM HG: ICD-10-PCS | Mod: HCNC,CPTII,S$GLB, | Performed by: NURSE PRACTITIONER

## 2023-02-02 PROCEDURE — 3288F FALL RISK ASSESSMENT DOCD: CPT | Mod: HCNC,CPTII,S$GLB, | Performed by: NURSE PRACTITIONER

## 2023-02-02 PROCEDURE — 3078F DIAST BP <80 MM HG: CPT | Mod: HCNC,CPTII,S$GLB, | Performed by: NURSE PRACTITIONER

## 2023-02-02 PROCEDURE — 3008F PR BODY MASS INDEX (BMI) DOCUMENTED: ICD-10-PCS | Mod: HCNC,CPTII,S$GLB, | Performed by: NURSE PRACTITIONER

## 2023-02-02 PROCEDURE — 1101F PR PT FALLS ASSESS DOC 0-1 FALLS W/OUT INJ PAST YR: ICD-10-PCS | Mod: HCNC,CPTII,S$GLB, | Performed by: NURSE PRACTITIONER

## 2023-02-02 PROCEDURE — 99999 PR PBB SHADOW E&M-EST. PATIENT-LVL V: ICD-10-PCS | Mod: PBBFAC,HCNC,, | Performed by: NURSE PRACTITIONER

## 2023-02-02 PROCEDURE — 99214 PR OFFICE/OUTPT VISIT, EST, LEVL IV, 30-39 MIN: ICD-10-PCS | Mod: HCNC,S$GLB,, | Performed by: NURSE PRACTITIONER

## 2023-02-02 PROCEDURE — 1101F PT FALLS ASSESS-DOCD LE1/YR: CPT | Mod: HCNC,CPTII,S$GLB, | Performed by: NURSE PRACTITIONER

## 2023-02-02 PROCEDURE — 99214 OFFICE O/P EST MOD 30 MIN: CPT | Mod: HCNC,S$GLB,, | Performed by: NURSE PRACTITIONER

## 2023-02-02 PROCEDURE — 3288F PR FALLS RISK ASSESSMENT DOCUMENTED: ICD-10-PCS | Mod: HCNC,CPTII,S$GLB, | Performed by: NURSE PRACTITIONER

## 2023-02-02 PROCEDURE — 1159F MED LIST DOCD IN RCRD: CPT | Mod: HCNC,CPTII,S$GLB, | Performed by: NURSE PRACTITIONER

## 2023-02-02 PROCEDURE — 1159F PR MEDICATION LIST DOCUMENTED IN MEDICAL RECORD: ICD-10-PCS | Mod: HCNC,CPTII,S$GLB, | Performed by: NURSE PRACTITIONER

## 2023-02-02 PROCEDURE — 3008F BODY MASS INDEX DOCD: CPT | Mod: HCNC,CPTII,S$GLB, | Performed by: NURSE PRACTITIONER

## 2023-02-02 PROCEDURE — 1126F AMNT PAIN NOTED NONE PRSNT: CPT | Mod: HCNC,CPTII,S$GLB, | Performed by: NURSE PRACTITIONER

## 2023-02-02 PROCEDURE — 3074F PR MOST RECENT SYSTOLIC BLOOD PRESSURE < 130 MM HG: ICD-10-PCS | Mod: HCNC,CPTII,S$GLB, | Performed by: NURSE PRACTITIONER

## 2023-02-02 RX ORDER — SILDENAFIL 100 MG/1
100 TABLET, FILM COATED ORAL DAILY PRN
Qty: 10 TABLET | Refills: 0 | Status: SHIPPED | OUTPATIENT
Start: 2023-02-02 | End: 2024-02-02

## 2023-02-02 NOTE — PROGRESS NOTES
This dictation has been generated using Modal Fluency Dictation some phonetic errors may occur. Please contact author for clarification if needed.     Problem List Items Addressed This Visit    None  Visit Diagnoses       Controlled type 2 diabetes mellitus with other circulatory complication, without long-term current use of insulin    -  Primary    Relevant Orders    Comprehensive Metabolic Panel    Hemoglobin A1C    Case Request Endoscopy: COLONOSCOPY (Completed)    Vasculogenic erectile dysfunction, unspecified vasculogenic erectile dysfunction type        Relevant Orders    Testosterone Panel    Fatigue, unspecified type        Relevant Orders    Testosterone Panel            Orders Placed This Encounter    Comprehensive Metabolic Panel    Hemoglobin A1C    Testosterone Panel    sildenafiL (VIAGRA) 100 MG tablet    Case Request Endoscopy: COLONOSCOPY     Diabetes uncontrolled.  Reviewed recent A1c.  Needs improve diet.  Recently resumed meds recheck A1c in the future.    Erectile dysfunction fatigue check testosterone panel as above.    Colon cancer screening referral for colonoscopy.    Follow up in about 2 months (around 4/2/2023).    ________________________________________________________________  ________________________________________________________________      Chief Complaint   Patient presents with    Annual Exam     History of present illness  This 67 y.o. presents today for complaint of following up on diabetes and notes complaint of erectile dysfunction.  Due for colon cancer screening.    Diabetes A1c assessed in December notes diabetes not controlled.  He had been out of medicine and having trouble with refills.  He needs to improve his diet.  He is back on his meds and no side effects.    Patient notes erectile dysfunction issues.  He has been  for 41 years.  He is had some performance issue.  Notes some fatigue.    Past Medical History:   Diagnosis Date    Diabetes mellitus      "Hypertension     Partial Achilles tendon tear 6/11/2012       History reviewed. No pertinent surgical history.    Family History   Problem Relation Age of Onset    Heart disease Mother     Hypertension Mother     Diabetes Mother     Kidney disease Mother     Alcohol abuse Father     Diabetes Sister     Heart disease Sister     Drug abuse Sister     Cancer Sister     Heart disease Brother     Alcohol abuse Brother        Social History     Socioeconomic History    Marital status:    Tobacco Use    Smoking status: Never    Smokeless tobacco: Never   Substance and Sexual Activity    Alcohol use: No    Drug use: No    Sexual activity: Yes       Current Outpatient Medications   Medication Sig Dispense Refill    alcohol swabs (DROPSAFE ALCOHOL PREP PADS) PadM USE TWICE DAILY 200 each 3    aspirin 81 MG Chew Take 1 tablet (81 mg total) by mouth once daily.  0    atorvastatin (LIPITOR) 80 MG tablet Take 1 tablet (80 mg total) by mouth once daily. 90 tablet 3    blood sugar diagnostic Strp Check 2x daily. 200 strip 4    ciclopirox (LOPROX) 0.77 % Crea Apply topically 2 (two) times daily. 90 g 3    diclofenac sodium (VOLTAREN) 1 % Gel APPLY 2 GRAMS TOPICALLY TWICE DAILY 100 g 0    ergocalciferol, vitamin D2, 50 mcg (2,000 unit) Tab Take 1 tablet by mouth Daily. 90 tablet 3    LEVEMIR FLEXTOUCH U-100 INSULN 100 unit/mL (3 mL) InPn pen INJECT 20 UNITS SUBCUTANEOUSLY ONCE DAILY AT NIGHT AT BEDTIME 15 mL 3    metFORMIN (GLUCOPHAGE) 1000 MG tablet Take 1 tablet (1,000 mg total) by mouth 2 (two) times daily. 180 tablet 3    pen needle, diabetic (DROPLET PEN NEEDLE) 32 gauge x 5/32" Ndle USE AS DIRECTED IN THE EVENING 100 each 11    RYBELSUS 7 mg tablet Take 1 tablet by mouth once daily 90 tablet 0    triamcinolone (NASACORT) 55 mcg nasal inhaler 2 sprays by Nasal route once daily. 16 g 0    TRUE METRIX AIR GLUCOSE METER kit USE AS DIRECTED 1 each 0    TRUEPLUS LANCETS 33 gauge Misc CHECK  GLUCOSE TWICE DAILY 200 each 3    " sildenafiL (VIAGRA) 100 MG tablet Take 1 tablet (100 mg total) by mouth daily as needed for Erectile Dysfunction. 10 tablet 0     No current facility-administered medications for this visit.       Review of patient's allergies indicates:   Allergen Reactions    Penicillins Rash     Happen as a child       Physical examination  Vitals Reviewed\  Vitals:    02/02/23 1557   BP: 110/60   Pulse: 73   Temp: 98.3 °F (36.8 °C)     Body mass index is 29.2 kg/m².      Weight: 89.7 kg (197 lb 12 oz)    Gen. Well-dressed well-nourished   Skin warm dry and intact.  No rashes noted.  Chest.  Respirations are even unlabored.  Lungs are clear to auscultation.  Cardiac regular rate and rhythm.  No chest wall adenopathy noted.  Neuro. Awake alert oriented x4.  Normal judgment and cognition noted.  Extremities no clubbing cyanosis or edema noted.     Call or return to clinic prn if these symptoms worsen or fail to improve as anticipated.

## 2023-02-02 NOTE — PATIENT INSTRUCTIONS
Cliff Flores,     If you are due for any health screening(s) below please notify me so we can arrange them to be ordered and scheduled to maintain your health. Most healthy patients complete it. Don't lose out on improving your health.     Tests to Keep You Healthy    Eye Exam: ORDERED BUT NOT SCHEDULED  Colon Cancer Screening: ORDERED  Last HbA1c < 8 (12/30/2022): Yes         Colon Cancer Screening    Of cancers affecting both men and women, colorectal cancer is the third leading cancer killer in the [place]. But it doesnt have to be. Screening can prevent colorectal cancer or find it at an early stage when treatment often leads to a cure.    A colonoscopy is the preferred test for detecting colon cancer. It is needed only once every 10 years if results are negative. While sedated, a flexible, lighted tube with a tiny camera is inserted into the rectum and advanced through the colon to look for cancers. An alternative screening test that is used at home and returned to the lab may also be used. It detects hidden blood in bowel movements which could indicate cancer in the colon. If results are positive, you will need a colonoscopy to determine if the blood is a sign of cancer. This type of follow up (diagnostic) colonoscopy usually requires additional copays as required by your insurance provider. Please contact your PCP if you have any questions.    Although you are still overdue for this important screening, due to the COVID-19 pandemic, we recommend scheduling it in the near future.       Diabetic Retinal Eye Exam    Diabetes is the #1 cause of blindness in the [place] - early detection before signs or symptoms develop can prevent debilitating blindness.    Once-a-year screening is recommended for all diabetic patients. This exam can prevent and treat diabetes complications in the eye before developing symptoms. This can be done with a special camera is used to take photographs of the back of your eye without  having to dilate them, or you can see an eye doctor for a full dilated exam.    Although you are still overdue for this important screening, due to the COVID-19 pandemic, we recommend scheduling it in the near future.

## 2023-02-03 ENCOUNTER — TELEPHONE (OUTPATIENT)
Dept: GASTROENTEROLOGY | Facility: CLINIC | Age: 68
End: 2023-02-03
Payer: MEDICARE

## 2023-02-03 NOTE — TELEPHONE ENCOUNTER
Colonoscopy 4/28 at 9am arrive for 8am  instructions reviewed and patient states understanding. Copy mailed address verified

## 2023-02-22 DIAGNOSIS — E11.9 TYPE 2 DIABETES MELLITUS WITHOUT COMPLICATION: ICD-10-CM

## 2023-02-28 ENCOUNTER — TELEPHONE (OUTPATIENT)
Dept: ENDOCRINOLOGY | Facility: CLINIC | Age: 68
End: 2023-02-28

## 2023-02-28 NOTE — TELEPHONE ENCOUNTER
Spoke with patient, here at the clinic, he thought his Rybelsus mg was supposed to be changed to higher one. If so he would like it sent to [facility]'s pharmacy.

## 2023-03-02 ENCOUNTER — TELEPHONE (OUTPATIENT)
Dept: ENDOCRINOLOGY | Facility: CLINIC | Age: 68
End: 2023-03-02
Payer: MEDICARE

## 2023-03-02 NOTE — TELEPHONE ENCOUNTER
----- Message from Toribio Stroud sent at 3/2/2023  2:37 PM CST -----  Type:  Patient Returning Call    Who Called:  Patient  Who Left Message for Patient:  Karina  Does the patient know what this is regarding?:    Best Call Back Number:  [phone]  Additional Information:

## 2023-04-24 ENCOUNTER — TELEPHONE (OUTPATIENT)
Dept: GASTROENTEROLOGY | Facility: CLINIC | Age: 68
End: 2023-04-24
Payer: MEDICARE

## 2023-04-24 NOTE — TELEPHONE ENCOUNTER
Patient states he never received the mailed copy of instructions wife states she did the miralax prep and knows what to do they did not want to come by the office and  a copy.

## 2023-04-28 ENCOUNTER — ANESTHESIA (OUTPATIENT)
Dept: ENDOSCOPY | Facility: HOSPITAL | Age: 68
End: 2023-04-28
Payer: MEDICARE

## 2023-04-28 ENCOUNTER — HOSPITAL ENCOUNTER (OUTPATIENT)
Facility: HOSPITAL | Age: 68
Discharge: HOME | End: 2023-04-28
Attending: INTERNAL MEDICINE | Admitting: INTERNAL MEDICINE
Payer: MEDICARE

## 2023-04-28 ENCOUNTER — ANESTHESIA EVENT (OUTPATIENT)
Dept: ENDOSCOPY | Facility: HOSPITAL | Age: 68
End: 2023-04-28
Payer: MEDICARE

## 2023-04-28 DIAGNOSIS — K64.8 INTERNAL HEMORRHOIDS: ICD-10-CM

## 2023-04-28 DIAGNOSIS — Z12.11 SCREENING FOR COLON CANCER: ICD-10-CM

## 2023-04-28 DIAGNOSIS — K63.5 POLYP OF COLON, UNSPECIFIED PART OF COLON, UNSPECIFIED TYPE: Primary | ICD-10-CM

## 2023-04-28 DIAGNOSIS — K57.90 DIVERTICULOSIS: ICD-10-CM

## 2023-04-28 LAB — POCT GLUCOSE: 84 MG/DL (ref 70–110)

## 2023-04-28 PROCEDURE — D9220A PRA ANESTHESIA: ICD-10-PCS | Mod: PT,CRNA,, | Performed by: NURSE ANESTHETIST, CERTIFIED REGISTERED

## 2023-04-28 PROCEDURE — D9220A PRA ANESTHESIA: Mod: PT,CRNA,, | Performed by: NURSE ANESTHETIST, CERTIFIED REGISTERED

## 2023-04-28 PROCEDURE — 45380 COLONOSCOPY AND BIOPSY: CPT | Mod: PT,59,HCNC, | Performed by: INTERNAL MEDICINE

## 2023-04-28 PROCEDURE — 27201089 HC SNARE, DISP (ANY): Mod: HCNC | Performed by: INTERNAL MEDICINE

## 2023-04-28 PROCEDURE — 25000003 PHARM REV CODE 250: Mod: HCNC | Performed by: NURSE ANESTHETIST, CERTIFIED REGISTERED

## 2023-04-28 PROCEDURE — D9220A PRA ANESTHESIA: ICD-10-PCS | Mod: PT,ANES,, | Performed by: ANESTHESIOLOGY

## 2023-04-28 PROCEDURE — 63600175 PHARM REV CODE 636 W HCPCS: Mod: HCNC | Performed by: NURSE ANESTHETIST, CERTIFIED REGISTERED

## 2023-04-28 PROCEDURE — 45380 COLONOSCOPY AND BIOPSY: CPT | Mod: PT,59,HCNC | Performed by: INTERNAL MEDICINE

## 2023-04-28 PROCEDURE — 37000008 HC ANESTHESIA 1ST 15 MINUTES: Mod: HCNC | Performed by: INTERNAL MEDICINE

## 2023-04-28 PROCEDURE — 45380 PR COLONOSCOPY,BIOPSY: ICD-10-PCS | Mod: PT,59,HCNC, | Performed by: INTERNAL MEDICINE

## 2023-04-28 PROCEDURE — 45385 COLONOSCOPY W/LESION REMOVAL: CPT | Mod: PT,HCNC,, | Performed by: INTERNAL MEDICINE

## 2023-04-28 PROCEDURE — 82962 GLUCOSE BLOOD TEST: CPT | Mod: HCNC | Performed by: INTERNAL MEDICINE

## 2023-04-28 PROCEDURE — 88305 TISSUE EXAM BY PATHOLOGIST: CPT | Mod: 26,HCNC,, | Performed by: STUDENT IN AN ORGANIZED HEALTH CARE EDUCATION/TRAINING PROGRAM

## 2023-04-28 PROCEDURE — 45385 PR COLONOSCOPY,REMV LESN,SNARE: ICD-10-PCS | Mod: PT,HCNC,, | Performed by: INTERNAL MEDICINE

## 2023-04-28 PROCEDURE — 88305 TISSUE EXAM BY PATHOLOGIST: ICD-10-PCS | Mod: 26,HCNC,, | Performed by: STUDENT IN AN ORGANIZED HEALTH CARE EDUCATION/TRAINING PROGRAM

## 2023-04-28 PROCEDURE — 27201012 HC FORCEPS, HOT/COLD, DISP: Mod: HCNC | Performed by: INTERNAL MEDICINE

## 2023-04-28 PROCEDURE — 88305 TISSUE EXAM BY PATHOLOGIST: CPT | Mod: HCNC | Performed by: STUDENT IN AN ORGANIZED HEALTH CARE EDUCATION/TRAINING PROGRAM

## 2023-04-28 PROCEDURE — D9220A PRA ANESTHESIA: Mod: PT,ANES,, | Performed by: ANESTHESIOLOGY

## 2023-04-28 PROCEDURE — 25000003 PHARM REV CODE 250: Mod: HCNC | Performed by: INTERNAL MEDICINE

## 2023-04-28 PROCEDURE — 45385 COLONOSCOPY W/LESION REMOVAL: CPT | Mod: PT,HCNC | Performed by: INTERNAL MEDICINE

## 2023-04-28 PROCEDURE — 37000009 HC ANESTHESIA EA ADD 15 MINS: Mod: HCNC | Performed by: INTERNAL MEDICINE

## 2023-04-28 RX ORDER — LIDOCAINE HYDROCHLORIDE 20 MG/ML
INJECTION INTRAVENOUS
Status: DISCONTINUED | OUTPATIENT
Start: 2023-04-28 | End: 2023-04-28

## 2023-04-28 RX ORDER — SODIUM CHLORIDE 9 MG/ML
INJECTION, SOLUTION INTRAVENOUS CONTINUOUS
Status: DISCONTINUED | OUTPATIENT
Start: 2023-04-28 | End: 2023-04-28 | Stop reason: HOSPADM

## 2023-04-28 RX ORDER — PROPOFOL 10 MG/ML
VIAL (ML) INTRAVENOUS
Status: DISCONTINUED | OUTPATIENT
Start: 2023-04-28 | End: 2023-04-28

## 2023-04-28 RX ORDER — PHENYLEPHRINE HYDROCHLORIDE 10 MG/ML
INJECTION INTRAVENOUS
Status: DISCONTINUED | OUTPATIENT
Start: 2023-04-28 | End: 2023-04-28

## 2023-04-28 RX ADMIN — PROPOFOL 50 MG: 10 INJECTION, EMULSION INTRAVENOUS at 10:04

## 2023-04-28 RX ADMIN — PHENYLEPHRINE HYDROCHLORIDE 100 MCG: 10 INJECTION INTRAVENOUS at 10:04

## 2023-04-28 RX ADMIN — LIDOCAINE HYDROCHLORIDE 100 MG: 20 INJECTION, SOLUTION INTRAVENOUS at 10:04

## 2023-04-28 RX ADMIN — SODIUM CHLORIDE: 9 INJECTION, SOLUTION INTRAVENOUS at 08:04

## 2023-04-28 RX ADMIN — PROPOFOL 100 MG: 10 INJECTION, EMULSION INTRAVENOUS at 10:04

## 2023-04-28 NOTE — ANESTHESIA PREPROCEDURE EVALUATION
04/28/2023  Mark Patino is a 67 y.o., male.      Pre-op Assessment    I have reviewed the Patient Summary Reports.     I have reviewed the Nursing Notes. I have reviewed the NPO Status.   I have reviewed the Medications.     Review of Systems  Anesthesia Hx:  No problems with previous Anesthesia    Social:  Non-Smoker    Cardiovascular:   Hypertension, well controlled    Pulmonary:  Pulmonary Normal    Renal/:   Chronic Renal Disease    Neurological:  Neurology Normal    Endocrine:   Diabetes, well controlled  Obesity / BMI > 30      Physical Exam  General: Well nourished, Cooperative, Alert and Oriented    Airway:  Mallampati: II   Mouth Opening: Normal  TM Distance: Normal  Neck ROM: Normal ROM        Anesthesia Plan  Type of Anesthesia, risks & benefits discussed:    Anesthesia Type: Gen ETT, Gen Supraglottic Airway, Gen Natural Airway, MAC  Intra-op Monitoring Plan: Standard ASA Monitors  Post Op Pain Control Plan: multimodal analgesia  Induction:  IV  Airway Plan: Direct, Video and Fiberoptic, Post-Induction  Informed Consent: Informed consent signed with the Patient and all parties understand the risks and agree with anesthesia plan.  All questions answered.   ASA Score: 3    Ready For Surgery From Anesthesia Perspective.     .

## 2023-04-28 NOTE — PROVATION PATIENT INSTRUCTIONS
Discharge Summary/Instructions after an Endoscopic Procedure  Patient Name: Mark Patino  Patient MRN: [id number]  Patient YOB: 1955  Friday, April 28, 2023  Booker James MD  Dear patient,  As a result of recent federal legislation (The Federal Cures Act), you may   receive lab or pathology results from your procedure in your [facility]   account before your physician is able to contact you. Your physician or   their representative will relay the results to you with their   recommendations at their soonest availability.  Thank you,  RESTRICTIONS:  During your procedure today, you received medications for sedation.  These   medications may affect your judgment, balance and coordination.  Therefore,   for 24 hours, you have the following restrictions:   - DO NOT drive a car, operate machinery, make legal/financial decisions,   sign important papers or drink alcohol.    ACTIVITY:  Today: no heavy lifting, straining or running due to procedural   sedation/anesthesia.  The following day: return to full activity including work.  DIET:  Eat and drink normally unless instructed otherwise.     TREATMENT FOR COMMON SIDE EFFECTS:  - Mild abdominal pain, nausea, belching, bloating or excessive gas:  rest,   eat lightly and use a heating pad.  - Sore Throat: treat with throat lozenges and/or gargle with warm salt   water.  - Because air was used during the procedure, expelling large amounts of air   from your rectum or belching is normal.  - If a bowel prep was taken, you may not have a bowel movement for 1-3 days.    This is normal.  SYMPTOMS TO WATCH FOR AND REPORT TO YOUR PHYSICIAN:  1. Abdominal pain or bloating, other than gas cramps.  2. Chest pain.  3. Back pain.  4. Signs of infection such as: chills or fever occurring within 24 hours   after the procedure.  5. Rectal bleeding, which would show as bright red, maroon, or black stools.   (A tablespoon of blood from the rectum is not serious, especially if    hemorrhoids are present.)  6. Vomiting.  7. Weakness or dizziness.  GO DIRECTLY TO THE NEAREST EMERGENCY ROOM IF YOU HAVE ANY OF THE FOLLOWING:      Difficulty breathing              Chills and/or fever over 101 F   Persistent vomiting and/or vomiting blood   Severe abdominal pain   Severe chest pain   Black, tarry stools   Bleeding- more than one tablespoon   Any other symptom or condition that you feel may need urgent attention  Your doctor recommends these additional instructions:  If any biopsies were taken, your doctors clinic will contact you in 1 to 2   weeks with any results.  - Patient has a contact number available for emergencies.  The signs and   symptoms of potential delayed complications were discussed with the   patient.  Return to normal activities tomorrow.  Written discharge   instructions were provided to the patient.   - High fiber diet.   - Continue present medications.   - Await pathology results.   - Repeat colonoscopy in 5 years for surveillance.   - Discharge patient to home (ambulatory).   - Return to GI office PRN.  For questions, problems or results please call your physician - Booker James MD at Work:  [phone].  [facility], EMERGENCY ROOM PHONE NUMBER: [phone]  IF A COMPLICATION OR EMERGENCY SITUATION ARISES AND YOU ARE UNABLE TO REACH   YOUR PHYSICIAN - GO DIRECTLY TO THE EMERGENCY ROOM.  Booker James MD  4/28/2023 10:35:57 AM  This report has been verified and signed electronically.  Dear patient,  As a result of recent federal legislation (The Federal Cures Act), you may   receive lab or pathology results from your procedure in your [facility]   account before your physician is able to contact you. Your physician or   their representative will relay the results to you with their   recommendations at their soonest availability.  Thank you,  PROVATION

## 2023-04-28 NOTE — TRANSFER OF CARE
"Anesthesia Transfer of Care Note    Patient: Mark Patino    Procedure(s) Performed: Procedure(s) (LRB):  COLONOSCOPY (N/A)    Patient location: PACU    Anesthesia Type: general    Transport from OR: Transported from OR on 2-3 L/min O2 by NC with adequate spontaneous ventilation    Post pain: adequate analgesia    Post assessment: no apparent anesthetic complications and tolerated procedure well    Post vital signs: stable    Level of consciousness: awake, alert and oriented    Nausea/Vomiting: no nausea/vomiting    Complications: none    Transfer of care protocol was followed      Last vitals:   Visit Vitals  BP (!) 153/85 (BP Location: Left arm, Patient Position: Sitting)   Pulse 109   Temp 36.7 °C (98.1 °F) (Skin)   Resp 16   Ht 5' 6" (1.676 m)   Wt 83.9 kg (185 lb)   SpO2 (!) 94%   BMI 29.86 kg/m²     "

## 2023-04-28 NOTE — ANESTHESIA POSTPROCEDURE EVALUATION
Anesthesia Post Evaluation    Patient: Mark Patino    Procedure(s) Performed: Procedure(s) (LRB):  COLONOSCOPY (N/A)    Final Anesthesia Type: general      Patient location during evaluation: PACU  Patient participation: Yes- Able to Participate  Level of consciousness: awake and alert and oriented  Post-procedure vital signs: reviewed and stable  Pain management: adequate  Airway patency: patent    PONV status at discharge: No PONV  Anesthetic complications: no      Cardiovascular status: blood pressure returned to baseline and stable  Respiratory status: unassisted and spontaneous ventilation  Hydration status: euvolemic  Follow-up not needed.          Vitals Value Taken Time   /70 04/28/23 1055   Temp 36.7 °C (98.1 °F) 04/28/23 1055   Pulse 77 04/28/23 1055   Resp 16 04/28/23 1055   SpO2 100 % 04/28/23 1055         Event Time   Out of Recovery 11:02:03         Pain/Ruth Score: Ruth Score: 10 (4/28/2023 10:55 AM)

## 2023-04-28 NOTE — H&P
CC: Screening for colorectal cancer - first occurrence    67 year old male with above. States that symptoms are absent, no alleviating/exacerbating factors. No family history of colorectal CA. No personal history of polyps. No bleeding or weight loss.     ROS:  No headache, no fever/chills, no chest pain/SOB, no nausea/vomiting/diarrhea/constipation/GI bleeding/abdominal pain, no dysuria/hematuria.    VSSAF   Exam:   Alert and oriented x 3; no apparent distress   PERRLA, sclera anicteric  CV: Regular rate/rhythm, normal PMI   Lungs: Clear bilaterally with no wheeze/rales   Abdomen: Soft, NT/ND, normal bowel sounds   Ext: No cyanosis, clubbing     Impression:   As above    Plan:   Proceed with endoscopy. Further recs to follow.

## 2023-04-28 NOTE — PLAN OF CARE
Vss, emma po fluids, denies pain, ambulates easily. IV removed, catheter intact. Discharge instructions provided and states understanding. States ready to go home.  Discharged from facility with family per wheelchair.

## 2023-05-01 VITALS
TEMPERATURE: 98 F | DIASTOLIC BLOOD PRESSURE: 70 MMHG | SYSTOLIC BLOOD PRESSURE: 110 MMHG | BODY MASS INDEX: 29.73 KG/M2 | OXYGEN SATURATION: 100 % | RESPIRATION RATE: 16 BRPM | HEIGHT: 66 IN | HEART RATE: 77 BPM | WEIGHT: 185 LBS

## 2023-05-10 ENCOUNTER — TELEPHONE (OUTPATIENT)
Dept: ENDOCRINOLOGY | Facility: CLINIC | Age: 68
End: 2023-05-10
Payer: MEDICARE

## 2023-05-11 ENCOUNTER — TELEPHONE (OUTPATIENT)
Dept: ENDOCRINOLOGY | Facility: CLINIC | Age: 68
End: 2023-05-11
Payer: MEDICARE

## 2023-05-12 LAB
FINAL PATHOLOGIC DIAGNOSIS: NORMAL
GROSS: NORMAL
Lab: NORMAL

## 2023-05-18 ENCOUNTER — PATIENT OUTREACH (OUTPATIENT)
Dept: ADMINISTRATIVE | Facility: HOSPITAL | Age: 68
End: 2023-05-18
Payer: MEDICARE

## 2023-05-18 NOTE — PROGRESS NOTES
Population Health Chart Review & Patient Outreach Details:     Reason for Outreach Encounter:     [x]  Non-Compliant Report   []  Payor Report (Humana, PHN, BCBS, MSSP, MCIP, UHC, etc.)   []  Pre-Visit Chart Review     Updates Requested / Reviewed:     [x]  Care Everywhere    [x]     []  External Sources ([facility], [facility], DIS, etc.)   [x]  Care Team Updated    Patient Outreach Method:    [x]  Telephone Outreach Completed   [x] Successful   [] Left Voicemail   [] Unable to Contact (wrong number, no voicemail)  []  [facility] Portal Outreach Sent  []  Letter Outreach Mailed  [x]  Fax Sent for External Records  []  External Records Upload    Health Maintenance Topics Addressed and Outreach Outcomes / Actions Taken:        []      Breast Cancer Screening []  Mammo Scheduled      []  External Records Requested     []  Added Reminder to Complete to Upcoming Primary Care Appt Notes     []  Patient Declined     []  Patient Will Call Back to Schedule     []  Patient Will Schedule with External Provider / Order Routed if Applicable             []       Cervical Cancer Screening []  Pap Scheduled      []  External Records Requested     []  Added Reminder to Complete to Upcoming Primary Care Appt Notes     []  Patient Declined     []  Patient Will Call Back to Schedule     []  Patient Will Schedule with External Provider               []          Colorectal Cancer Screening []  Colonoscopy Case Request or Referral Placed     []  External Records Requested     []  Added Reminder to Complete to Upcoming Primary Care Appt Notes     []  Patient Declined     []  Patient Will Call Back to Schedule     []  Patient Will Schedule with External Provider     []  Fit Kit Mailed (add the SmartPhrase under additional notes)     []  Reminded Patient to Complete Home Test             []      Diabetic Eye Exam []  Eye Camera Scheduled or Optometry Referral Placed     [x]  External Records Requested - [facility]     []  Added Reminder  to Complete to Upcoming Primary Care Appt Notes     []  Patient Declined     []  Patient Will Call Back to Schedule     []  Patient Will Schedule with External Provider             []      Blood Pressure Control []  Primary Care Follow Up Visit Scheduled     []  Remote Blood Pressure Reading Captured     []  Added Reminder to Complete to Upcoming Primary Care Appt Notes     []  Patient Declined     []  Patient Will Call Back / Patient Will Send Portal Message with Reading     []  Patient Will Call Back to Schedule Provider Visit             []       HbA1c & Other Labs []  Lab Appt Scheduled for Due Labs     []  Primary Care Follow Up Visit Scheduled      []  Reminded Patient to Complete Home Test     []  Added Reminder to Complete to Upcoming Primary Care Appt Notes     []  Patient Declined     []  Patient Will Call Back to Schedule     []  Patient Will Schedule with External Provider / Order Routed if Applicable           []    Schedule Primary Care Appt []  Primary Care Appt Scheduled     []  Patient Declined     []  Patient Will Call Back to Schedule     []  Pt Established with External Provider & Updated Care Team             []      Medication Adherence []  Primary Care Appointment Scheduled     []  Added Reminder to Upcoming Primary Care Appt Notes     []  Patient Reminded to  Prescription     []  Patient Declined, Provider Notified if Needed     []  Sent Provider Message to Review and/or Add Exclusion to Problem List             []      Osteoporosis Screening []  DXA Appointment Scheduled     []  External Records Requested     []  Added Reminder to Complete to Upcoming Primary Care Appt Notes     []  Patient Declined     []  Patient Will Call Back to Schedule     []  Patient Will Schedule with External Provider / Order Routed if Applicable     Additional Care Coordinator Notes:     Added Reminder Foot Exam due to upcoming endocrine appointment.    Further Action Needed If Patient Returns Outreach:

## 2023-05-22 ENCOUNTER — PATIENT OUTREACH (OUTPATIENT)
Dept: ADMINISTRATIVE | Facility: HOSPITAL | Age: 68
End: 2023-05-22
Payer: MEDICARE

## 2023-05-22 NOTE — PROGRESS NOTES
Population Health Chart Review & Patient Outreach Details:     Reason for Outreach Encounter:     [x]  Non-Compliant Report   []  Payor Report (Humana, PHN, BCBS, MSSP, MCIP, UHC, etc.)   []  Pre-Visit Chart Review     Updates Requested / Reviewed:     []  Care Everywhere    []     []  External Sources ([facility], [facility], DIS, etc.)   []  Care Team Updated    Patient Outreach Method:    []  Telephone Outreach Completed   [] Successful   [] Left Voicemail   [] Unable to Contact (wrong number, no voicemail)  []  [facility] Portal Outreach Sent  []  Letter Outreach Mailed  []  Fax Sent for External Records  [x]  External Records Upload    Health Maintenance Topics Addressed and Outreach Outcomes / Actions Taken:        []      Breast Cancer Screening []  Mammo Scheduled      []  External Records Requested     []  Added Reminder to Complete to Upcoming Primary Care Appt Notes     []  Patient Declined     []  Patient Will Call Back to Schedule     []  Patient Will Schedule with External Provider / Order Routed if Applicable             []       Cervical Cancer Screening []  Pap Scheduled      []  External Records Requested     []  Added Reminder to Complete to Upcoming Primary Care Appt Notes     []  Patient Declined     []  Patient Will Call Back to Schedule     []  Patient Will Schedule with External Provider               []          Colorectal Cancer Screening []  Colonoscopy Case Request or Referral Placed     []  External Records Requested     []  Added Reminder to Complete to Upcoming Primary Care Appt Notes     []  Patient Declined     []  Patient Will Call Back to Schedule     []  Patient Will Schedule with External Provider     []  Fit Kit Mailed (add the SmartPhrase under additional notes)     []  Reminded Patient to Complete Home Test             [x]      Diabetic Eye Exam []  Eye Camera Scheduled or Optometry Referral Placed     []  External Records Requested     []  Added Reminder to Complete to  Upcoming Primary Care Appt Notes     []  Patient Declined     []  Patient Will Call Back to Schedule     []  Patient Will Schedule with External Provider             []      Blood Pressure Control []  Primary Care Follow Up Visit Scheduled     []  Remote Blood Pressure Reading Captured     []  Added Reminder to Complete to Upcoming Primary Care Appt Notes     []  Patient Declined     []  Patient Will Call Back / Patient Will Send Portal Message with Reading     []  Patient Will Call Back to Schedule Provider Visit             []       HbA1c & Other Labs []  Lab Appt Scheduled for Due Labs     []  Primary Care Follow Up Visit Scheduled      []  Reminded Patient to Complete Home Test     []  Added Reminder to Complete to Upcoming Primary Care Appt Notes     []  Patient Declined     []  Patient Will Call Back to Schedule     []  Patient Will Schedule with External Provider / Order Routed if Applicable           []    Schedule Primary Care Appt []  Primary Care Appt Scheduled     []  Patient Declined     []  Patient Will Call Back to Schedule     []  Pt Established with External Provider & Updated Care Team             []      Medication Adherence []  Primary Care Appointment Scheduled     []  Added Reminder to Upcoming Primary Care Appt Notes     []  Patient Reminded to  Prescription     []  Patient Declined, Provider Notified if Needed     []  Sent Provider Message to Review and/or Add Exclusion to Problem List             []      Osteoporosis Screening []  DXA Appointment Scheduled     []  External Records Requested     []  Added Reminder to Complete to Upcoming Primary Care Appt Notes     []  Patient Declined     []  Patient Will Call Back to Schedule     []  Patient Will Schedule with External Provider / Order Routed if Applicable     Additional Care Coordinator Notes:         Further Action Needed If Patient Returns Outreach:

## 2023-05-24 ENCOUNTER — TELEPHONE (OUTPATIENT)
Dept: GASTROENTEROLOGY | Facility: CLINIC | Age: 68
End: 2023-05-24
Payer: MEDICARE

## 2023-05-24 NOTE — TELEPHONE ENCOUNTER
----- Message from Booker Sánchez MD sent at 5/12/2023  2:48 PM CDT -----  Please advise patient that polyp pathology was reviewed and is benign and is the nonadenomatous type which is precancerous/risk factor for cancer.  Repeat colonoscopy recommended in 10 years, not 5 as previously thought.  Place reminder in system for repeat colonoscopy.

## 2023-06-02 ENCOUNTER — TELEPHONE (OUTPATIENT)
Dept: GASTROENTEROLOGY | Facility: CLINIC | Age: 68
End: 2023-06-02
Payer: MEDICARE

## 2023-06-02 NOTE — TELEPHONE ENCOUNTER
----- Message from Wilber Irving sent at 6/2/2023  1:14 PM CDT -----  Regarding: pt called  Name of Who is Calling: GODWIN MOREIRA [[id number]]  Cait (wife)           What is the request in detail: Pt called for colonoscopy results           Can the clinic reply by [facility]: No           What Number to Call Back if not in [facility]: [phone]

## 2023-06-29 ENCOUNTER — OFFICE VISIT (OUTPATIENT)
Dept: ENDOCRINOLOGY | Facility: CLINIC | Age: 68
End: 2023-06-29
Payer: MEDICARE

## 2023-06-29 ENCOUNTER — LAB VISIT (OUTPATIENT)
Dept: LAB | Facility: HOSPITAL | Age: 68
End: 2023-06-29
Attending: PHYSICIAN ASSISTANT
Payer: MEDICARE

## 2023-06-29 VITALS
BODY MASS INDEX: 29.95 KG/M2 | OXYGEN SATURATION: 99 % | DIASTOLIC BLOOD PRESSURE: 82 MMHG | WEIGHT: 186.38 LBS | SYSTOLIC BLOOD PRESSURE: 122 MMHG | HEART RATE: 73 BPM | TEMPERATURE: 99 F | HEIGHT: 66 IN

## 2023-06-29 DIAGNOSIS — N52.9 ERECTILE DYSFUNCTION, UNSPECIFIED ERECTILE DYSFUNCTION TYPE: ICD-10-CM

## 2023-06-29 DIAGNOSIS — E11.65 TYPE 2 DIABETES MELLITUS WITH HYPERGLYCEMIA, WITHOUT LONG-TERM CURRENT USE OF INSULIN: ICD-10-CM

## 2023-06-29 DIAGNOSIS — E66.9 OBESITY (BMI 30-39.9): ICD-10-CM

## 2023-06-29 DIAGNOSIS — E78.5 HYPERLIPIDEMIA, UNSPECIFIED HYPERLIPIDEMIA TYPE: ICD-10-CM

## 2023-06-29 DIAGNOSIS — E11.65 TYPE 2 DIABETES MELLITUS WITH HYPERGLYCEMIA, WITHOUT LONG-TERM CURRENT USE OF INSULIN: Primary | ICD-10-CM

## 2023-06-29 DIAGNOSIS — B35.1 ONYCHOMYCOSIS: ICD-10-CM

## 2023-06-29 LAB
ESTIMATED AVG GLUCOSE: 126 MG/DL (ref 68–131)
HBA1C MFR BLD: 6 % (ref 4–5.6)

## 2023-06-29 PROCEDURE — 3079F DIAST BP 80-89 MM HG: CPT | Mod: CPTII,S$GLB,, | Performed by: PHYSICIAN ASSISTANT

## 2023-06-29 PROCEDURE — 3008F PR BODY MASS INDEX (BMI) DOCUMENTED: ICD-10-PCS | Mod: CPTII,S$GLB,, | Performed by: PHYSICIAN ASSISTANT

## 2023-06-29 PROCEDURE — 3079F PR MOST RECENT DIASTOLIC BLOOD PRESSURE 80-89 MM HG: ICD-10-PCS | Mod: CPTII,S$GLB,, | Performed by: PHYSICIAN ASSISTANT

## 2023-06-29 PROCEDURE — 3074F PR MOST RECENT SYSTOLIC BLOOD PRESSURE < 130 MM HG: ICD-10-PCS | Mod: CPTII,S$GLB,, | Performed by: PHYSICIAN ASSISTANT

## 2023-06-29 PROCEDURE — 1126F AMNT PAIN NOTED NONE PRSNT: CPT | Mod: CPTII,S$GLB,, | Performed by: PHYSICIAN ASSISTANT

## 2023-06-29 PROCEDURE — 99214 OFFICE O/P EST MOD 30 MIN: CPT | Mod: S$GLB,,, | Performed by: PHYSICIAN ASSISTANT

## 2023-06-29 PROCEDURE — 3288F PR FALLS RISK ASSESSMENT DOCUMENTED: ICD-10-PCS | Mod: CPTII,S$GLB,, | Performed by: PHYSICIAN ASSISTANT

## 2023-06-29 PROCEDURE — 99214 PR OFFICE/OUTPT VISIT, EST, LEVL IV, 30-39 MIN: ICD-10-PCS | Mod: S$GLB,,, | Performed by: PHYSICIAN ASSISTANT

## 2023-06-29 PROCEDURE — 84403 ASSAY OF TOTAL TESTOSTERONE: CPT | Performed by: PHYSICIAN ASSISTANT

## 2023-06-29 PROCEDURE — 1160F RVW MEDS BY RX/DR IN RCRD: CPT | Mod: CPTII,S$GLB,, | Performed by: PHYSICIAN ASSISTANT

## 2023-06-29 PROCEDURE — 1159F MED LIST DOCD IN RCRD: CPT | Mod: CPTII,S$GLB,, | Performed by: PHYSICIAN ASSISTANT

## 2023-06-29 PROCEDURE — 1126F PR PAIN SEVERITY QUANTIFIED, NO PAIN PRESENT: ICD-10-PCS | Mod: CPTII,S$GLB,, | Performed by: PHYSICIAN ASSISTANT

## 2023-06-29 PROCEDURE — 1159F PR MEDICATION LIST DOCUMENTED IN MEDICAL RECORD: ICD-10-PCS | Mod: CPTII,S$GLB,, | Performed by: PHYSICIAN ASSISTANT

## 2023-06-29 PROCEDURE — 83036 HEMOGLOBIN GLYCOSYLATED A1C: CPT | Performed by: PHYSICIAN ASSISTANT

## 2023-06-29 PROCEDURE — 84270 ASSAY OF SEX HORMONE GLOBUL: CPT | Performed by: PHYSICIAN ASSISTANT

## 2023-06-29 PROCEDURE — 3008F BODY MASS INDEX DOCD: CPT | Mod: CPTII,S$GLB,, | Performed by: PHYSICIAN ASSISTANT

## 2023-06-29 PROCEDURE — 1101F PR PT FALLS ASSESS DOC 0-1 FALLS W/OUT INJ PAST YR: ICD-10-PCS | Mod: CPTII,S$GLB,, | Performed by: PHYSICIAN ASSISTANT

## 2023-06-29 PROCEDURE — 99999 PR PBB SHADOW E&M-EST. PATIENT-LVL IV: ICD-10-PCS | Mod: PBBFAC,,, | Performed by: PHYSICIAN ASSISTANT

## 2023-06-29 PROCEDURE — 36415 COLL VENOUS BLD VENIPUNCTURE: CPT | Mod: PO | Performed by: PHYSICIAN ASSISTANT

## 2023-06-29 PROCEDURE — 1160F PR REVIEW ALL MEDS BY PRESCRIBER/CLIN PHARMACIST DOCUMENTED: ICD-10-PCS | Mod: CPTII,S$GLB,, | Performed by: PHYSICIAN ASSISTANT

## 2023-06-29 PROCEDURE — 3288F FALL RISK ASSESSMENT DOCD: CPT | Mod: CPTII,S$GLB,, | Performed by: PHYSICIAN ASSISTANT

## 2023-06-29 PROCEDURE — 99999 PR PBB SHADOW E&M-EST. PATIENT-LVL IV: CPT | Mod: PBBFAC,,, | Performed by: PHYSICIAN ASSISTANT

## 2023-06-29 PROCEDURE — 3074F SYST BP LT 130 MM HG: CPT | Mod: CPTII,S$GLB,, | Performed by: PHYSICIAN ASSISTANT

## 2023-06-29 PROCEDURE — 1101F PT FALLS ASSESS-DOCD LE1/YR: CPT | Mod: CPTII,S$GLB,, | Performed by: PHYSICIAN ASSISTANT

## 2023-06-29 RX ORDER — ORAL SEMAGLUTIDE 14 MG/1
14 TABLET ORAL DAILY
Qty: 90 TABLET | Refills: 3 | Status: SHIPPED | OUTPATIENT
Start: 2023-06-29

## 2023-06-29 NOTE — PROGRESS NOTES
"CC: This 68 y.o. male presents for management of Diabetes Mellitus and chronic conditions pending review including HTN, HLP     HPI: was diagnosed with T2DM in .   Has never been hospitalized r/t DM.  Family hx of DM: sisters  Fhx of thyroid disease:n/a  Denies missing doses of DM medication.   hypoglycemia at home: none  Monitoring BG at home 1x daily:  Fastins    Diet:   BF-gardner grahams  LH-salad, chicken  DN-broccoli noodle soup  Snacks on fruit.  Drinks coke 2x weekly.     Exercise: Plays basketball on  and volleyball on , yard work.    CURRENT DM MEDS: metformin 2 g qd bid, Levemir 20 units qhs, Rybelsus 7 mg daily    Previous meds:  jardiance-too expensive  Glimepiride    Standards of Care:  Eye exam:    Podiatry exam: n/a  DE:     Taking vd daily.    PMHx, PSHx: reviewed in epic.  Social Hx: no E/T use. He has three children.    Wt Readings from Last 10 Encounters:   23 84.5 kg (186 lb 6.4 oz)   23 83.9 kg (185 lb)   23 89.7 kg (197 lb 12 oz)   23 91.3 kg (201 lb 6.2 oz)   22 88.2 kg (194 lb 7.1 oz)   22 83.7 kg (184 lb 8.4 oz)   22 88 kg (194 lb)   21 88 kg (194 lb)   21 88.4 kg (194 lb 12.8 oz)   21 88.2 kg (194 lb 7.1 oz)       ROS:   Gen: Appetite good, + wt loss (9 lbs), denies fatigue and weakness.  Skin: Skin is intact and heals well, no rashes, no hair changes  Eyes: Denies visual disturbances  Resp: no SOB or PINO, no cough  Cardiac: No palpitations, chest pain, no edema   GI: No nausea or vomiting, diarrhea, constipation, or abdominal pain.  /GYN: + ED, No nocturia, burning or pain.   MS/Neuro: Denies numbness/ tingling in BLE; Gait steady, speech clear  Psych: Denies drug/ETOH abuse, no hx of depression.  Other systems: negative.    /82 (BP Location: Left arm, Patient Position: Sitting, BP Method: Small (Manual))   Pulse 73   Temp 98.6 °F (37 °C) (Oral)   Ht 5' 6" (1.676 m)   Wt 84.5 kg (186 " lb 6.4 oz)   SpO2 99%   BMI 30.09 kg/m²      PE:  GENERAL: middle aged male, well developed, well nourished.  PSYCH: AAOx3, appropriate mood and affect, pleasant expression, conversant, appears relaxed, well groomed.   EYES: Conjunctiva, corneas clear  NECK: Supple, trachea midline,no thyromegaly or nodules  CHEST: Resp even and unlabored, CTA bilateral.  CARDIAC: RRR, S1, S2 heard, no murmurs  VASCULAR: DP pulses +2/4 bilaterally, no edema.  NEURO: Gait steady, CN ll-Xll grossly intact  SKIN: Skin warm and dry no acanthosis nigracans.  6/23  Foot Exam: no sores or macerations noted.     Protective Sensation (w/ 10 gram monofilament):  Right: Intact  Left: Intact    Visual Inspection:  Normal -  Bilateral and Nails Intact - without Evidence of Foot Deformity- Bilateral    Pedal Pulses:   Right: Present  Left: Present    Posterior tibialis:   Right:Present  Left: Present     Vibratory Sensation  Right:Positive  Left:Positive     Personally reviewed labs below:    No visits with results within 1 Month(s) from this visit.   Latest known visit with results is:   Patient Outreach on 05/22/2023   Component Date Value Ref Range Status    Left Eye DM Retinopathy 08/10/2022 Negative   Final    Right Eye DM Retinopathy 08/10/2022 Negative   Final         ASSESSMENT and PLAN:    1. Type 2 diabetes mellitus with hyperglycemia, without long-term current use of insulin  Hemoglobin A1C      2. Hyperlipidemia, unspecified hyperlipidemia type        3. Obesity (BMI 30-39.9)        4. Erectile dysfunction, unspecified erectile dysfunction type  Testosterone Panel      5. Onychomycosis               T2DM with hyperglycemia-A1c today. Increase Rybelsus to 14 mg daily. Decrease Levemir to 10 units nightly. Continue Metformin. Discussed DM, progression of disease, long term complications, tx options. Check glucose 2x daily. F/u w/ PCP.  Discussed A1c and BG goals.   Reviewed  hypoglycemia, s/s and appropriate tx.   Instructed to  monitor BG and bring meter/ log to every clinic visit.   - takes ASA, statin    HLP -stable LDL , on statin therapy, LFTs WNL.    Obesity- Body mass index is 30.09 kg/m². Increase exercise to 30 min daily. Continue wt loss.   Erectile dysfunction-testosterone today.  Onychomycosis-continue ciclopirox bid.    Labs today  Follow-up:prn

## 2023-07-07 LAB
ALBUMIN SERPL-MCNC: 4.1 G/DL (ref 3.6–5.1)
SHBG SERPL-SCNC: 61 NMOL/L (ref 22–77)
TESTOST FREE SERPL-MCNC: 47.3 PG/ML (ref 46–224)
TESTOST SERPL-MCNC: 585 NG/DL (ref 250–1100)
TESTOSTERONE.FREE+WB SERPL-MCNC: 89.1 NG/DL (ref 110–575)

## 2023-09-27 DIAGNOSIS — E11.9 TYPE 2 DIABETES MELLITUS WITHOUT COMPLICATION: ICD-10-CM

## 2023-10-18 RX ORDER — ISOPROPYL ALCOHOL 70 ML/100ML
SWAB TOPICAL
Qty: 100 EACH | Refills: 12 | Status: SHIPPED | OUTPATIENT
Start: 2023-10-18

## 2023-10-18 RX ORDER — LANCETS 33 GAUGE
EACH MISCELLANEOUS
Qty: 200 EACH | Refills: 10 | Status: SHIPPED | OUTPATIENT
Start: 2023-10-18

## 2024-02-19 DIAGNOSIS — E11.65 TYPE 2 DIABETES MELLITUS WITH HYPERGLYCEMIA, WITHOUT LONG-TERM CURRENT USE OF INSULIN: Primary | ICD-10-CM

## 2024-02-19 DIAGNOSIS — E78.5 HYPERLIPIDEMIA, UNSPECIFIED HYPERLIPIDEMIA TYPE: ICD-10-CM

## 2024-02-19 RX ORDER — INSULIN DETEMIR 100 [IU]/ML
INJECTION, SOLUTION SUBCUTANEOUS
Qty: 15 ML | Refills: 0 | Status: SHIPPED | OUTPATIENT
Start: 2024-02-19

## 2024-02-19 RX ORDER — ATORVASTATIN CALCIUM 80 MG/1
80 TABLET, FILM COATED ORAL
Qty: 90 TABLET | Refills: 0 | Status: SHIPPED | OUTPATIENT
Start: 2024-02-19

## 2024-02-19 RX ORDER — PEN NEEDLE, DIABETIC 30 GX3/16"
NEEDLE, DISPOSABLE MISCELLANEOUS
Qty: 100 EACH | Refills: 0 | Status: SHIPPED | OUTPATIENT
Start: 2024-02-19

## 2024-11-28 ENCOUNTER — HOSPITAL ENCOUNTER (EMERGENCY)
Facility: HOSPITAL | Age: 69
Discharge: HOME | End: 2024-11-28
Attending: EMERGENCY MEDICINE
Payer: MEDICARE

## 2024-11-28 ENCOUNTER — NURSE TRIAGE (OUTPATIENT)
Dept: ADMINISTRATIVE | Facility: CLINIC | Age: 69
End: 2024-11-28
Payer: MEDICARE

## 2024-11-28 VITALS
RESPIRATION RATE: 18 BRPM | SYSTOLIC BLOOD PRESSURE: 143 MMHG | TEMPERATURE: 98 F | BODY MASS INDEX: 30.82 KG/M2 | DIASTOLIC BLOOD PRESSURE: 81 MMHG | HEART RATE: 65 BPM | WEIGHT: 185 LBS | HEIGHT: 65 IN | OXYGEN SATURATION: 97 %

## 2024-11-28 DIAGNOSIS — T78.40XA ALLERGIC REACTION, INITIAL ENCOUNTER: Primary | ICD-10-CM

## 2024-11-28 LAB
HCV AB SERPL QL IA: NEGATIVE
HIV 1+2 AB+HIV1 P24 AG SERPL QL IA: NEGATIVE

## 2024-11-28 PROCEDURE — 96374 THER/PROPH/DIAG INJ IV PUSH: CPT

## 2024-11-28 PROCEDURE — 25000003 PHARM REV CODE 250: Performed by: EMERGENCY MEDICINE

## 2024-11-28 PROCEDURE — 86803 HEPATITIS C AB TEST: CPT | Performed by: EMERGENCY MEDICINE

## 2024-11-28 PROCEDURE — 99284 EMERGENCY DEPT VISIT MOD MDM: CPT | Mod: 25

## 2024-11-28 PROCEDURE — 63600175 PHARM REV CODE 636 W HCPCS: Performed by: EMERGENCY MEDICINE

## 2024-11-28 PROCEDURE — 96361 HYDRATE IV INFUSION ADD-ON: CPT

## 2024-11-28 PROCEDURE — 87389 HIV-1 AG W/HIV-1&-2 AB AG IA: CPT | Performed by: EMERGENCY MEDICINE

## 2024-11-28 PROCEDURE — 96375 TX/PRO/DX INJ NEW DRUG ADDON: CPT

## 2024-11-28 PROCEDURE — 36415 COLL VENOUS BLD VENIPUNCTURE: CPT | Performed by: EMERGENCY MEDICINE

## 2024-11-28 RX ORDER — HYDROXYZINE PAMOATE 25 MG/1
25 CAPSULE ORAL
Status: COMPLETED | OUTPATIENT
Start: 2024-11-28 | End: 2024-11-28

## 2024-11-28 RX ORDER — PREDNISONE 20 MG/1
40 TABLET ORAL DAILY
Qty: 10 TABLET | Refills: 0 | Status: SHIPPED | OUTPATIENT
Start: 2024-11-28 | End: 2024-12-03

## 2024-11-28 RX ORDER — METHYLPREDNISOLONE SOD SUCC 125 MG
125 VIAL (EA) INJECTION
Status: COMPLETED | OUTPATIENT
Start: 2024-11-28 | End: 2024-11-28

## 2024-11-28 RX ORDER — FAMOTIDINE 10 MG/ML
20 INJECTION INTRAVENOUS
Status: COMPLETED | OUTPATIENT
Start: 2024-11-28 | End: 2024-11-28

## 2024-11-28 RX ADMIN — METHYLPREDNISOLONE SODIUM SUCCINATE 125 MG: 125 INJECTION, POWDER, FOR SOLUTION INTRAMUSCULAR; INTRAVENOUS at 10:11

## 2024-11-28 RX ADMIN — FAMOTIDINE 20 MG: 10 INJECTION, SOLUTION INTRAVENOUS at 10:11

## 2024-11-28 RX ADMIN — HYDROXYZINE PAMOATE 25 MG: 25 CAPSULE ORAL at 10:11

## 2024-11-28 RX ADMIN — SODIUM CHLORIDE 1000 ML: 9 INJECTION, SOLUTION INTRAVENOUS at 10:11

## 2024-11-28 NOTE — ED PROVIDER NOTES
"Encounter Date: 11/28/2024       History     Chief Complaint   Patient presents with    Allergic Reaction     From hair dye used this AM, hives "all over"     Patient presents emergency department with reported reported hives onset this morning after dying his hair states he has had a similar reaction many years ago to die but it was been years since he had had difficulty no shortness of breath he did take 2 Benadryl prior to arrival emergency department states the Benadryl helped the itching that he felt weak prompting him to come to the ER for further evaluation        Review of patient's allergies indicates:   Allergen Reactions    Penicillins Rash     Happen as a child     Past Medical History:   Diagnosis Date    Diabetes mellitus     Hypertension     Partial Achilles tendon tear 6/11/2012     Past Surgical History:   Procedure Laterality Date    COLONOSCOPY N/A 4/28/2023    Procedure: COLONOSCOPY;  Surgeon: Booker Sánchez MD;  Location: [facility];  Service: Endoscopy;  Laterality: N/A;     Family History   Problem Relation Name Age of Onset    Heart disease Mother      Hypertension Mother      Diabetes Mother      Kidney disease Mother      Alcohol abuse Father      Diabetes Sister      Heart disease Sister      Drug abuse Sister      Cancer Sister      Heart disease Brother      Alcohol abuse Brother       Social History     Tobacco Use    Smoking status: Never    Smokeless tobacco: Never   Substance Use Topics    Alcohol use: No    Drug use: No     Review of Systems   Skin:  Positive for rash.   All other systems reviewed and are negative.      Physical Exam     Initial Vitals [11/28/24 0950]   BP Pulse Resp Temp SpO2   (!) 98/58 108 18 97.5 °F (36.4 °C) 95 %      MAP       --         Physical Exam    Constitutional: He appears well-developed and well-nourished. No distress.   HENT:   Head: Normocephalic and atraumatic.   Right Ear: External ear normal.   Left Ear: External ear normal. Mouth/Throat: " Oropharynx is clear and moist.   No posterior pharyngeal erythema or swelling noted   Eyes: Pupils are equal, round, and reactive to light.   Neck: Neck supple.   Normal range of motion.  Cardiovascular:  Normal rate, regular rhythm, S1 normal, S2 normal, normal heart sounds and intact distal pulses.           Pulmonary/Chest: Breath sounds normal. He has no wheezes.   Abdominal: Abdomen is soft. Bowel sounds are normal. There is no abdominal tenderness.   Musculoskeletal:         General: Normal range of motion.      Cervical back: Normal range of motion and neck supple.     Neurological: He is alert and oriented to person, place, and time. GCS eye subscore is 4. GCS verbal subscore is 5. GCS motor subscore is 6.   Skin: Skin is warm and dry. Rash noted.   Scattered urticaria   Psychiatric: He has a normal mood and affect. His behavior is normal.         ED Course   Procedures  Labs Reviewed   HEPATITIS C ANTIBODY       Result Value    Hepatitis C Ab Negative      Narrative:     Release to patient->Immediate   HIV 1 / 2 ANTIBODY    HIV 1/2 Ag/Ab Negative      Narrative:     Release to patient->Immediate          Imaging Results    None          Medications   methylPREDNISolone sodium succinate injection 125 mg (125 mg Intravenous Given 11/28/24 1007)   famotidine (PF) injection 20 mg (20 mg Intravenous Given 11/28/24 1007)   hydrOXYzine pamoate capsule 25 mg (25 mg Oral Given 11/28/24 1007)   sodium chloride 0.9% bolus 1,000 mL 1,000 mL (0 mLs Intravenous Stopped 11/28/24 1130)     Medical Decision Making  Patient's symptoms improved significantly with steroids and Pepcid Vistaril given for itching will continue prednisone as an outpatient recommend Benadryl q.6 hours for the next 24 hours and as needed for itching outpatient follow up with the primary care provider avoidance of hair dye in the future    Risk  Prescription drug management.                                      Clinical Impression:  Final  diagnoses:  [T78.40XA] Allergic reaction, initial encounter (Primary)          ED Disposition Condition    Discharge Stable          ED Prescriptions       Medication Sig Dispense Start Date End Date Auth. Provider    predniSONE (DELTASONE) 20 MG tablet Take 2 tablets (40 mg total) by mouth once daily. for 5 days 10 tablet 11/28/2024 12/3/2024 Avni Rousseau MD          Follow-up Information       Follow up With Specialties Details Why Contact Info    Lorna Marrufo NP Palliative Medicine In 1 day for further evaluation and treatment [address] [address]  [phone]               Avni Rousseau MD  11/28/24 [id number]

## 2024-11-28 NOTE — TELEPHONE ENCOUNTER
Speaking with patient, states he dyed his hair this morning, about an hour ago. States it was burning, washed it out. Started itching, and dizzy. States he took a Benadryl. Denies any trouble breathing or swallowing. States he is itching all over and his face is swelling up. Triage done- dispo 911. States spouse is going to bring him to ED. Advised 911 again. Verb understanding.   Reason for Disposition   Other symptom of severe allergic reaction  (Exception: Hives or facial swelling alone.)    Additional Information   Negative: [1] Life-threatening reaction in the past to similar substance (e.g., food, insect bite/sting, medication, etc.) AND [2] < 2 hours since exposure   Negative: Wheezing, stridor, hoarseness, or difficulty breathing   Negative: [1] Tightness in the chest or throat AND [2] begins within 2 hours of exposure to allergic substance   Negative: Difficulty swallowing, drooling or slurred speech   Negative: Difficult to awaken or acting confused (e.g., disoriented, slurred speech)   Negative: Unresponsive, passed out or very weak    Protocols used: [id number]

## 2025-08-15 DIAGNOSIS — R74.8 ELEVATED ALKALINE PHOSPHATASE LEVEL: Primary | ICD-10-CM
